# Patient Record
Sex: MALE | Race: WHITE | NOT HISPANIC OR LATINO | Employment: OTHER | URBAN - NONMETROPOLITAN AREA
[De-identification: names, ages, dates, MRNs, and addresses within clinical notes are randomized per-mention and may not be internally consistent; named-entity substitution may affect disease eponyms.]

---

## 2020-10-15 ENCOUNTER — TELEPHONE (OUTPATIENT)
Dept: VASCULAR SURGERY | Facility: CLINIC | Age: 80
End: 2020-10-15

## 2020-10-15 ENCOUNTER — OFFICE VISIT (OUTPATIENT)
Dept: VASCULAR SURGERY | Facility: CLINIC | Age: 80
End: 2020-10-15

## 2020-10-15 VITALS
SYSTOLIC BLOOD PRESSURE: 136 MMHG | HEIGHT: 70 IN | WEIGHT: 183 LBS | DIASTOLIC BLOOD PRESSURE: 86 MMHG | HEART RATE: 61 BPM | OXYGEN SATURATION: 95 % | BODY MASS INDEX: 26.2 KG/M2

## 2020-10-15 DIAGNOSIS — E78.00 HYPERCHOLESTEREMIA: ICD-10-CM

## 2020-10-15 DIAGNOSIS — I65.23 BILATERAL CAROTID ARTERY STENOSIS: Primary | ICD-10-CM

## 2020-10-15 PROCEDURE — 99204 OFFICE O/P NEW MOD 45 MIN: CPT | Performed by: SURGERY

## 2020-10-15 RX ORDER — MULTIPLE VITAMINS W/ MINERALS TAB 9MG-400MCG
1 TAB ORAL DAILY
COMMUNITY

## 2020-10-15 RX ORDER — ROSUVASTATIN CALCIUM 10 MG/1
10 TABLET, COATED ORAL NIGHTLY
COMMUNITY
Start: 2020-09-18

## 2020-10-15 RX ORDER — ASPIRIN 81 MG/1
81 TABLET ORAL DAILY
COMMUNITY

## 2020-10-15 RX ORDER — VITAMIN E 268 MG
1 CAPSULE ORAL DAILY
COMMUNITY

## 2020-10-15 NOTE — PROGRESS NOTES
10/15/2020      Rigo Negrete MD  [address] [place] [address]    Ranjith Rice  1940    Chief Complaint   Patient presents with   • Establish Care     Bilateral carotid stenosis.  Pt had testing on 20 in [place], [place].  Pt had a CT done on 10/12/20 in [place].  Pt denies any stroke like symptoms.  Pt referred by Dr. Rigo Negrete.   • Med Management     Verbally went over the patient's medications with him.  Pt is taking a low dose aspirin and Crestor.       Dear Rigo Negrete MD:      HPI  I had the pleasure of seeing your patient Ranjith Rice in the office today.  Thank you kindly for this consultation.  As you recall, Ranjith Rice is a 80 y.o.  male who you are currently following for routine health maintenance.  He denies strokelike symptoms, but was recently having trouble breathing.  Upon exam, he was found to have a carotid bruit and sent for testing.  He had a carotid duplex and CTA of the neck at an outside facility, showing significant right carotid stenosis of 90%.  He is maintained on aspirin and Crestor.      Past Medical History:   Diagnosis Date   • Hypercholesteremia        Past Surgical History:   Procedure Laterality Date   • KNEE SURGERY Left        History reviewed. No pertinent family history.    Social History     Socioeconomic History   • Marital status: Single     Spouse name: Not on file   • Number of children: Not on file   • Years of education: Not on file   • Highest education level: Not on file   Tobacco Use   • Smoking status: Former Smoker     Types: Cigarettes     Quit date:      Years since quittin.7   • Smokeless tobacco: Never Used   Substance and Sexual Activity   • Alcohol use: Not Currently   • Drug use: Never   • Sexual activity: Defer       No Known Allergies    Current Outpatient Medications   Medication Instructions   • Ascorbic Acid (VITAMIN C ER PO) Oral   • aspirin 81 mg, Oral, Daily   • Flaxseed, Linseed, (FLAX SEED OIL PO) Oral   • Multiple  "Vitamins-Minerals (multivitamin with minerals) tablet tablet 1 tablet, Oral, Daily   • rosuvastatin (CRESTOR) 10 MG tablet No dose, route, or frequency recorded.   • VITAMIN E PO Oral         Review of Systems   Constitutional: Negative.    HENT: Negative.    Eyes: Negative.    Respiratory: Negative.    Cardiovascular: Negative.    Gastrointestinal: Negative.    Endocrine: Negative.    Genitourinary: Negative.    Musculoskeletal: Negative.    Skin: Negative.    Allergic/Immunologic: Negative.    Neurological: Negative.    Hematological: Negative.    Psychiatric/Behavioral: Negative.    All other systems reviewed and are negative.      /86   Pulse 61   Ht 177.8 cm (70\")   Wt 83 kg (183 lb)   SpO2 95%   BMI 26.26 kg/m²   Physical Exam  Vitals signs and nursing note reviewed.   Constitutional:       Appearance: He is well-developed.   HENT:      Head: Normocephalic and atraumatic.   Eyes:      General: No scleral icterus.     Pupils: Pupils are equal, round, and reactive to light.   Neck:      Musculoskeletal: Neck supple.      Thyroid: No thyromegaly.      Vascular: No carotid bruit or JVD.   Cardiovascular:      Rate and Rhythm: Normal rate and regular rhythm.      Pulses:           Carotid pulses are 2+ on the right side and 2+ on the left side.       Femoral pulses are 2+ on the right side and 2+ on the left side.       Popliteal pulses are 2+ on the right side and 2+ on the left side.        Dorsalis pedis pulses are 2+ on the right side and 2+ on the left side.        Posterior tibial pulses are 2+ on the right side and 2+ on the left side.      Heart sounds: Normal heart sounds.   Pulmonary:      Effort: Pulmonary effort is normal.      Breath sounds: Normal breath sounds.   Abdominal:      General: Bowel sounds are normal. There is no distension or abdominal bruit.      Palpations: Abdomen is soft. There is no mass.      Tenderness: There is no abdominal tenderness.   Musculoskeletal: Normal range of " motion.   Lymphadenopathy:      Cervical: No cervical adenopathy.   Skin:     General: Skin is warm and dry.   Neurological:      Mental Status: He is alert and oriented to person, place, and time.      Cranial Nerves: No cranial nerve deficit.      Sensory: No sensory deficit.   Psychiatric:         Mood and Affect: Mood normal.         Behavior: Behavior normal.         Thought Content: Thought content normal.         Judgment: Judgment normal.                 Patient Active Problem List   Diagnosis   • Hypercholesteremia   • Bilateral carotid artery stenosis         ICD-10-CM ICD-9-CM   1. Bilateral carotid artery stenosis  I65.23 433.10     433.30   2. Hypercholesteremia  E78.00 272.0       Plan: After thoroughly evaluating Ranjith Rice, I believe the best course of action is to proceed with a right carotid endarterectomy for stroke risk reduction.  Patient does have significant right-sided carotid occlusive disease confirmed on CTA. Risks of carotid endarterectomy include, but are not limited to, bleeding, infection, vessel damage, nerve damage, MI, stroke, and death.  The patient understands these risks and would like to proceed with the procedure.  He will also need cardiac clearance prior to the procedure.  Once he is cleared, we will call him and schedule appropriately.  In the meantime, I will have the disc sent here so I can personally review it. I did discuss vascular risk factors as they pertain to the progression of vascular disease including controlling hypercholesterolemia.  This risk factors currently controlled and stable.  The patient can continue taking their current medication regimen as previously planned.  This was all discussed in full with complete understanding.    Thank you for allowing me to participate in the care of your patient.  Please do not hesitate with any questions or concerns.  I will keep you aware of any further encounters with Ranjith Rice.        Sincerely yours,         Gabriel  LES Wright DO         Scribed for Dr. Gabriel Wright by Tracey KESSLER

## 2020-10-15 NOTE — TELEPHONE ENCOUNTER
Spoke with Arelis at [facility] in regards to getting Mr Rice's CT Scan on a disc. Arelis advised they could get us the Disc mailed out they just needed us to fax over a request with Name, Date of Birth, and what we are requesting faxed to [phone].

## 2020-10-19 ENCOUNTER — OFFICE VISIT (OUTPATIENT)
Dept: CARDIOLOGY | Facility: CLINIC | Age: 80
End: 2020-10-19

## 2020-10-19 VITALS
DIASTOLIC BLOOD PRESSURE: 86 MMHG | HEART RATE: 75 BPM | SYSTOLIC BLOOD PRESSURE: 150 MMHG | OXYGEN SATURATION: 96 % | HEIGHT: 70 IN | BODY MASS INDEX: 25.77 KG/M2 | WEIGHT: 180 LBS

## 2020-10-19 DIAGNOSIS — Z87.891 EX-SMOKER FOR MORE THAN 1 YEAR: ICD-10-CM

## 2020-10-19 DIAGNOSIS — Z01.810 PREOP CARDIOVASCULAR EXAM: ICD-10-CM

## 2020-10-19 DIAGNOSIS — R06.09 DOE (DYSPNEA ON EXERTION): ICD-10-CM

## 2020-10-19 DIAGNOSIS — I65.23 BILATERAL CAROTID ARTERY STENOSIS: ICD-10-CM

## 2020-10-19 DIAGNOSIS — R94.31 ABNORMAL ECG: ICD-10-CM

## 2020-10-19 DIAGNOSIS — E78.00 HYPERCHOLESTEREMIA: Primary | ICD-10-CM

## 2020-10-19 PROBLEM — I49.1 PREMATURE ATRIAL COMPLEXES: Status: ACTIVE | Noted: 2020-10-19

## 2020-10-19 PROCEDURE — 99204 OFFICE O/P NEW MOD 45 MIN: CPT | Performed by: INTERNAL MEDICINE

## 2020-10-19 PROCEDURE — 93000 ELECTROCARDIOGRAM COMPLETE: CPT | Performed by: INTERNAL MEDICINE

## 2020-10-19 NOTE — PROGRESS NOTES
Ranjith Rice  [id number]  1940  80 y.o.  male    Referring Provider: Rigo Negrete MD    Reason for  Visit:  Initial visit for  preoperative cardiovascular clearance under general anesthesia for   Bilateral carotid artery stenosis right worse than left for right carotid endarterectomy under follow up Dr Wright      Subjective    Mild chronic exertional shortness of breath on exertion relieved with rest  No significant cough or wheezing    No palpitations  No associated chest pain  No significant pedal edema    No fever or chills  No significant expectoration    No hemoptysis  No presyncope or syncope    Tolerating current medications well with no untoward side effects   Compliant with prescribed medication regimen. Tries to adhere to cardiac diet.     Joint pain in small, medium and large joints   Prior left knee replacement   Ex smoker     BP well controlled at home.     Effort tolerance limited more by orthopedic rather than cardiac related issues, therefore difficult to assess functional capacity.       History of present illness:  Ranjith Rice is a 80 y.o. yo male with history of COPD  who presents today for   Chief Complaint   Patient presents with   • SX Clearance     Carotid Artery wtih Dr Wright-  patient has SOB all the time with COPD. patient does not currently monitor her weight or BP at home. Aptient has not been having any symptoms at this time.    .    History  Past Medical History:   Diagnosis Date   • COPD (chronic obstructive pulmonary disease) (CMS/[facility])    • Hypercholesteremia    ,   Past Surgical History:   Procedure Laterality Date   • KNEE SURGERY Left    ,   Family History   Problem Relation Age of Onset   • No Known Problems Mother    • No Known Problems Father    ,   Social History     Tobacco Use   • Smoking status: Former Smoker     Years: 30.00     Types: Cigarettes     Quit date:      Years since quittin.8   • Smokeless tobacco: Never Used   Substance Use Topics   • Alcohol  "use: Not Currently   • Drug use: Never   ,     Medications  Current Outpatient Medications   Medication Sig Dispense Refill   • Ascorbic Acid (VITAMIN C ER PO) Take  by mouth.     • aspirin 81 MG EC tablet Take 81 mg by mouth Daily.     • Flaxseed, Linseed, (FLAX SEED OIL PO) Take  by mouth.     • Garlic 10 MG capsule Take  by mouth.     • Multiple Vitamins-Minerals (multivitamin with minerals) tablet tablet Take 1 tablet by mouth Daily.     • rosuvastatin (CRESTOR) 10 MG tablet      • VITAMIN E PO Take  by mouth.       No current facility-administered medications for this visit.        Allergies:  Patient has no known allergies.    Review of Systems  Review of Systems   Constitution: Negative for malaise/fatigue.   HENT: Negative.    Eyes: Negative.    Cardiovascular: Positive for dyspnea on exertion. Negative for chest pain, claudication, cyanosis, irregular heartbeat, leg swelling, near-syncope, orthopnea, palpitations, paroxysmal nocturnal dyspnea and syncope.   Respiratory: Negative.    Endocrine: Negative.    Hematologic/Lymphatic: Negative.    Skin: Negative.    Musculoskeletal: Positive for arthritis and joint pain.   Gastrointestinal: Negative for anorexia.   Genitourinary: Negative.    Neurological: Negative.    Psychiatric/Behavioral: Negative.        Objective      Vitals:    10/19/20 1309 10/19/20 1334   BP: 180/92 150/86   Pulse: 75    SpO2: 96%    Weight: 81.6 kg (180 lb)    Height: 177.8 cm (70\")       Physical Exam:  /86   Pulse 75   Ht 177.8 cm (70\")   Wt 81.6 kg (180 lb)   SpO2 96%   BMI 25.83 kg/m²     Physical Exam  Constitutional:       Appearance: He is well-developed.   HENT:      Head: Normocephalic.   Neck:      Musculoskeletal: No edema.      Vascular: Normal carotid pulses. Carotid bruit present. No JVD.      Trachea: No tracheal tenderness or tracheal deviation.   Cardiovascular:      Rate and Rhythm: Regular rhythm.      Pulses: Normal pulses.      Heart sounds: Murmur " present. Systolic murmur present with a grade of 2/6.   Pulmonary:      Effort: Pulmonary effort is normal.      Breath sounds: No stridor.   Abdominal:      General: There is no distension.      Palpations: Abdomen is soft.      Tenderness: There is no abdominal tenderness.   Skin:     General: Skin is warm.   Neurological:      Mental Status: He is alert.      Cranial Nerves: No cranial nerve deficit.      Sensory: No sensory deficit.   Psychiatric:         Speech: Speech normal.         Behavior: Behavior normal.         Results Review:      ____________________________________________________________________________________________________________________________________________  Health maintenance and recommendations    Low salt/ HTN/ Heart healthy carbohydrate restricted cardiac diet   The patient is advised to reduce or avoid caffeine or other cardiac stimulants.   Minimize or avoid  NSAID-type medications      Monitor for any signs of bleeding including red or dark stools. Fall precautions.   Advised staying uptodate with immunizations per established standard guidelines.    Offered to give patient  a copy of my notes     Questions were encouraged, asked and answered to the patient's  understanding and satisfaction. Questions if any regarding current medications and side effects, need for refills and importance of compliance to medications stressed.    Reviewed available prior notes, consults, prior visits, laboratory findings, radiology and cardiology relevant reports. Updated chart as applicable. I have reviewed the patient's medical history in detail and updated the computerized patient record as relevant.      Updated patient regarding any new or relevant abnormalities on review of records or any new findings on physical exam. Mentioned to patient about purpose of visit and desirable health short and long term goals and objectives.    Primary to monitor CBC CMP Lipid panel and TSH as  applicable    ___________________________________________________________________________________________________________________________________________         ECG 12 Lead    Date/Time: 10/19/2020 1:27 PM  Performed by: Ricky Mckeon MD  Authorized by: Ricky Mckeon MD   Comparison: not compared with previous ECG   Rhythm: sinus rhythm and sinus arrhythmia  Rate: normal  QRS axis: right  Other findings: non-specific ST-T wave changes    Clinical impression: abnormal EKG            Assessment/Plan   Diagnoses and all orders for this visit:    1. Hypercholesteremia (Primary)    2. Bilateral carotid artery stenosis    3. Preop cardiovascular exam    4. Ex-smoker for more than 1 year    5. MANTILLA (dyspnea on exertion)  -     Adult Stress Echo W/ Cont or Stress Agent if Necessary Per Protocol; Future    6. Abnormal ECG  -     Adult Transthoracic Echo Complete W/ Cont if Necessary Per Protocol; Future    Other orders  -     ECG 12 Lead             Plan    Orders Placed This Encounter   Procedures   • ECG 12 Lead     This order was created via procedure documentation   • Adult Transthoracic Echo Complete W/ Cont if Necessary Per Protocol     Standing Status:   Future     Standing Expiration Date:   10/19/2021     Order Specific Question:   Reason for exam?     Answer:   Dyspnea   • Adult Stress Echo W/ Cont or Stress Agent if Necessary Per Protocol     Myocardial strain to be performed during echocardiogram as long as technically feasible     Standing Status:   Future     Standing Expiration Date:   10/19/2021     Order Specific Question:   What stress agent will be used?     Answer:   Dobutamine     Order Specific Question:   Difficulty walking criteria?     Answer:   Musculoskeletal (hips, knees, feet, back, amputee)     Order Specific Question:   Reason for exam?     Answer:   Dyspnea        Call for results of cardiac tests after done for clearance for surgery      Keep LDL below 70 mg/dl. Monitor liver and renal  functions.   Monitor CBC, CMP, TSH (as indicated) and Lipid Panel by primary         Return in about 6 months (around 4/19/2021).

## 2020-10-27 ENCOUNTER — HOSPITAL ENCOUNTER (OUTPATIENT)
Dept: CARDIOLOGY | Facility: HOSPITAL | Age: 80
Discharge: HOME | End: 2020-10-27

## 2020-10-27 VITALS
BODY MASS INDEX: 25.77 KG/M2 | SYSTOLIC BLOOD PRESSURE: 150 MMHG | DIASTOLIC BLOOD PRESSURE: 86 MMHG | WEIGHT: 180 LBS | HEIGHT: 70 IN

## 2020-10-27 VITALS
WEIGHT: 178.57 LBS | SYSTOLIC BLOOD PRESSURE: 170 MMHG | HEIGHT: 70 IN | BODY MASS INDEX: 25.56 KG/M2 | DIASTOLIC BLOOD PRESSURE: 81 MMHG | HEART RATE: 70 BPM

## 2020-10-27 DIAGNOSIS — R06.09 DOE (DYSPNEA ON EXERTION): ICD-10-CM

## 2020-10-27 DIAGNOSIS — R94.31 ABNORMAL ECG: ICD-10-CM

## 2020-10-27 PROCEDURE — 93017 CV STRESS TEST TRACING ONLY: CPT

## 2020-10-27 PROCEDURE — 25010000003 DOBUTAMINE PER 250 MG: Performed by: INTERNAL MEDICINE

## 2020-10-27 PROCEDURE — 93350 STRESS TTE ONLY: CPT

## 2020-10-27 PROCEDURE — 93352 ADMIN ECG CONTRAST AGENT: CPT | Performed by: INTERNAL MEDICINE

## 2020-10-27 PROCEDURE — 25010000002 PERFLUTREN 6.52 MG/ML SUSPENSION: Performed by: INTERNAL MEDICINE

## 2020-10-27 PROCEDURE — 93350 STRESS TTE ONLY: CPT | Performed by: INTERNAL MEDICINE

## 2020-10-27 PROCEDURE — 93018 CV STRESS TEST I&R ONLY: CPT | Performed by: INTERNAL MEDICINE

## 2020-10-27 PROCEDURE — 93306 TTE W/DOPPLER COMPLETE: CPT | Performed by: INTERNAL MEDICINE

## 2020-10-27 PROCEDURE — 93306 TTE W/DOPPLER COMPLETE: CPT

## 2020-10-27 RX ORDER — DOBUTAMINE HYDROCHLORIDE 100 MG/100ML
10-50 INJECTION INTRAVENOUS CONTINUOUS
Status: DISCONTINUED | OUTPATIENT
Start: 2020-10-27 | End: 2020-10-28 | Stop reason: HOSPADM

## 2020-10-27 RX ADMIN — Medication 10 MCG/KG/MIN: at 10:48

## 2020-10-27 RX ADMIN — PERFLUTREN 8.48 MG: 6.52 INJECTION, SUSPENSION INTRAVENOUS at 10:47

## 2020-10-28 LAB
BH CV ECHO MEAS - AO MAX PG (FULL): 34.4 MMHG
BH CV ECHO MEAS - AO MAX PG: 37.2 MMHG
BH CV ECHO MEAS - AO MEAN PG (FULL): 21 MMHG
BH CV ECHO MEAS - AO MEAN PG: 23 MMHG
BH CV ECHO MEAS - AO ROOT AREA: 9.1 CM^2
BH CV ECHO MEAS - AO ROOT DIAM: 3.4 CM
BH CV ECHO MEAS - AO V2 MAX: 305 CM/SEC
BH CV ECHO MEAS - AO V2 MEAN: 231 CM/SEC
BH CV ECHO MEAS - AO V2 VTI: 82.1 CM
BH CV ECHO MEAS - AVA(I,A): 0.89 CM^2
BH CV ECHO MEAS - AVA(I,D): 0.89 CM^2
BH CV ECHO MEAS - AVA(V,A): 0.87 CM^2
BH CV ECHO MEAS - AVA(V,D): 0.87 CM^2
BH CV ECHO MEAS - EDV(CUBED): 46.7 ML
BH CV ECHO MEAS - EDV(MOD-SP4): 76 ML
BH CV ECHO MEAS - EDV(TEICH): 54.4 ML
BH CV ECHO MEAS - EF(CUBED): 70.4 %
BH CV ECHO MEAS - EF(MOD-SP4): 61.8 %
BH CV ECHO MEAS - EF(TEICH): 63 %
BH CV ECHO MEAS - ESV(CUBED): 13.8 ML
BH CV ECHO MEAS - ESV(MOD-SP4): 29 ML
BH CV ECHO MEAS - ESV(TEICH): 20.2 ML
BH CV ECHO MEAS - FS: 33.3 %
BH CV ECHO MEAS - IVS/LVPW: 1
BH CV ECHO MEAS - IVSD: 0.9 CM
BH CV ECHO MEAS - LA DIMENSION: 3.3 CM
BH CV ECHO MEAS - LA/AO: 0.97
BH CV ECHO MEAS - LAT PEAK E' VEL: 7.7 CM/SEC
BH CV ECHO MEAS - LV MASS(C)D: 92.8 GRAMS
BH CV ECHO MEAS - LV MAX PG: 2.8 MMHG
BH CV ECHO MEAS - LV MEAN PG: 2 MMHG
BH CV ECHO MEAS - LV V1 MAX: 84.2 CM/SEC
BH CV ECHO MEAS - LV V1 MEAN: 55.6 CM/SEC
BH CV ECHO MEAS - LV V1 VTI: 23.3 CM
BH CV ECHO MEAS - LVIDD: 3.6 CM
BH CV ECHO MEAS - LVIDS: 2.4 CM
BH CV ECHO MEAS - LVLD AP4: 7.8 CM
BH CV ECHO MEAS - LVLS AP4: 6.1 CM
BH CV ECHO MEAS - LVOT AREA (M): 3.1 CM^2
BH CV ECHO MEAS - LVOT AREA: 3.1 CM^2
BH CV ECHO MEAS - LVOT DIAM: 2 CM
BH CV ECHO MEAS - LVPWD: 0.9 CM
BH CV ECHO MEAS - MED PEAK E' VEL: 6 CM/SEC
BH CV ECHO MEAS - MV A MAX VEL: 57.3 CM/SEC
BH CV ECHO MEAS - MV DEC SLOPE: 291 CM/SEC^2
BH CV ECHO MEAS - MV DEC TIME: 0.19 SEC
BH CV ECHO MEAS - MV E MAX VEL: 74 CM/SEC
BH CV ECHO MEAS - MV E/A: 1.3
BH CV ECHO MEAS - MV P1/2T MAX VEL: 82.8 CM/SEC
BH CV ECHO MEAS - MV P1/2T: 83.3 MSEC
BH CV ECHO MEAS - MVA P1/2T LCG: 2.7 CM^2
BH CV ECHO MEAS - MVA(P1/2T): 2.6 CM^2
BH CV ECHO MEAS - PA MAX PG: 2.8 MMHG
BH CV ECHO MEAS - PA V2 MAX: 84.2 CM/SEC
BH CV ECHO MEAS - SV(AO): 745.4 ML
BH CV ECHO MEAS - SV(CUBED): 32.8 ML
BH CV ECHO MEAS - SV(LVOT): 73.2 ML
BH CV ECHO MEAS - SV(MOD-SP4): 47 ML
BH CV ECHO MEAS - SV(TEICH): 34.3 ML
BH CV ECHO MEAS - TR MAX VEL: 100 CM/SEC
BH CV ECHO MEASUREMENTS AVERAGE E/E' RATIO: 10.8
BH CV STRESS BP STAGE 1: NORMAL
BH CV STRESS BP STAGE 2: NORMAL
BH CV STRESS DOSE DOBUTAMINE STAGE 1: 10
BH CV STRESS DOSE DOBUTAMINE STAGE 2: 20
BH CV STRESS DURATION MIN STAGE 1: 3
BH CV STRESS DURATION MIN STAGE 2: 3
BH CV STRESS DURATION SEC STAGE 1: 0
BH CV STRESS DURATION SEC STAGE 2: 55
BH CV STRESS ECHO POST STRESS EJECTION FRACTION EF: 65 %
BH CV STRESS HR STAGE 1: 89
BH CV STRESS HR STAGE 2: 125
BH CV STRESS PROTOCOL 1: NORMAL
BH CV STRESS RECOVERY BP: NORMAL MMHG
BH CV STRESS RECOVERY HR: 89 BPM
BH CV STRESS STAGE 1: 1
BH CV STRESS STAGE 2: 2
LV EF 2D ECHO EST: 55 %
MAXIMAL PREDICTED HEART RATE: 142 BPM
MAXIMAL PREDICTED HEART RATE: 142 BPM
PERCENT MAX PREDICTED HR: 88.03 %
STRESS BASELINE BP: NORMAL MMHG
STRESS BASELINE HR: 65 BPM
STRESS PERCENT HR: 104 %
STRESS POST EXERCISE DUR MIN: 6 MIN
STRESS POST EXERCISE DUR SEC: 55 SEC
STRESS POST PEAK BP: NORMAL MMHG
STRESS POST PEAK HR: 125 BPM
STRESS TARGET HR: 121 BPM
STRESS TARGET HR: 121 BPM

## 2020-10-29 ENCOUNTER — TELEPHONE (OUTPATIENT)
Dept: CARDIOLOGY | Facility: CLINIC | Age: 80
End: 2020-10-29

## 2020-11-03 ENCOUNTER — TELEPHONE (OUTPATIENT)
Dept: VASCULAR SURGERY | Facility: CLINIC | Age: 80
End: 2020-11-03

## 2020-11-03 ENCOUNTER — PREP FOR SURGERY (OUTPATIENT)
Dept: OTHER | Facility: HOSPITAL | Age: 80
End: 2020-11-03

## 2020-11-03 DIAGNOSIS — Z79.02 ENCOUNTER FOR MONITORING ANTIPLATELET THERAPY: ICD-10-CM

## 2020-11-03 DIAGNOSIS — Z01.818 PREOP TESTING: ICD-10-CM

## 2020-11-03 DIAGNOSIS — I65.23 BILATERAL CAROTID ARTERY STENOSIS: Primary | ICD-10-CM

## 2020-11-03 DIAGNOSIS — Z51.81 ENCOUNTER FOR MONITORING ANTIPLATELET THERAPY: ICD-10-CM

## 2020-11-03 RX ORDER — BUPIVACAINE HCL/0.9 % NACL/PF 0.1 %
2 PLASTIC BAG, INJECTION (ML) EPIDURAL ONCE
Status: CANCELLED | OUTPATIENT
Start: 2020-11-03 | End: 2020-11-03

## 2020-11-03 NOTE — TELEPHONE ENCOUNTER
----- Message from CHECO Kay sent at 11/3/2020 10:02 AM CST -----  Regarding: FW: Cardiac Risk Assessment    ----- Message -----  From: Ricky Mckeon MD  Sent: 11/1/2020   5:17 AM CST  To: CHECO Kay  Subject: RE: Cardiac Risk Assessment                      Acceptable cardiovascular risk of planned procedure.  Can proceed with surgery with usual caution and perioperative hemodynamic and cardiac rhythm monitoring.     ----- Message -----  From: Wanda Wall APRN  Sent: 10/29/2020   1:11 PM CST  To: Ricky Mckeon MD  Subject: Cardiac Risk Assessment                            ----- Message -----  From: Sherly Lee  Sent: 10/29/2020   1:07 PM CDT  To: CHECO Kay    Can you please advise of heart clearance?  Testing has been completed.  Thank you!

## 2020-11-11 ENCOUNTER — TELEPHONE (OUTPATIENT)
Dept: VASCULAR SURGERY | Facility: CLINIC | Age: 80
End: 2020-11-11

## 2020-11-11 ENCOUNTER — TRANSCRIBE ORDERS (OUTPATIENT)
Dept: ADMINISTRATIVE | Facility: HOSPITAL | Age: 80
End: 2020-11-11

## 2020-11-11 DIAGNOSIS — Z01.818 PREOP TESTING: Primary | ICD-10-CM

## 2020-11-11 NOTE — TELEPHONE ENCOUNTER
Spoke with patient and advised of upcoming procedure.  Patient pre work is scheduled for 11/13/2020 at 1015 am.  Patient procedure is scheduled for 11/16/2020 at 800 am.  Patient advised of covid testing, masking, and visitation, including that there are no overnight guests. Patient advised of location time and prep.  Patient expressed understanding for all that was discussed.

## 2020-11-11 NOTE — TELEPHONE ENCOUNTER
Left message requesting patient return my call in reference to upcoming procedure with Dr. Wright.  Patient pre work is scheduled for 11/13/2020 at 1015 am.  Patient procedure is scheduled for 11/16/2020 at 800 am.  Advised of phone number for patient return my call.

## 2020-11-13 ENCOUNTER — LAB (OUTPATIENT)
Dept: LAB | Facility: HOSPITAL | Age: 80
End: 2020-11-13

## 2020-11-13 ENCOUNTER — TELEPHONE (OUTPATIENT)
Dept: VASCULAR SURGERY | Facility: CLINIC | Age: 80
End: 2020-11-13

## 2020-11-13 ENCOUNTER — HOSPITAL ENCOUNTER (OUTPATIENT)
Dept: GENERAL RADIOLOGY | Facility: HOSPITAL | Age: 80
Discharge: HOME | End: 2020-11-13

## 2020-11-13 ENCOUNTER — APPOINTMENT (OUTPATIENT)
Dept: PREADMISSION TESTING | Facility: HOSPITAL | Age: 80
End: 2020-11-13

## 2020-11-13 VITALS
WEIGHT: 177.69 LBS | HEART RATE: 78 BPM | HEIGHT: 68 IN | OXYGEN SATURATION: 95 % | RESPIRATION RATE: 16 BRPM | SYSTOLIC BLOOD PRESSURE: 144 MMHG | DIASTOLIC BLOOD PRESSURE: 87 MMHG | BODY MASS INDEX: 26.93 KG/M2

## 2020-11-13 DIAGNOSIS — I65.23 BILATERAL CAROTID ARTERY STENOSIS: ICD-10-CM

## 2020-11-13 DIAGNOSIS — Z79.02 ENCOUNTER FOR MONITORING ANTIPLATELET THERAPY: ICD-10-CM

## 2020-11-13 DIAGNOSIS — Z51.81 ENCOUNTER FOR MONITORING ANTIPLATELET THERAPY: ICD-10-CM

## 2020-11-13 DIAGNOSIS — Z01.818 PREOP TESTING: ICD-10-CM

## 2020-11-13 LAB
ANION GAP SERPL CALCULATED.3IONS-SCNC: 7 MMOL/L (ref 5–15)
APTT PPP: 34.3 SECONDS (ref 24.1–35)
BASOPHILS # BLD AUTO: 0.06 10*3/MM3 (ref 0–0.2)
BASOPHILS NFR BLD AUTO: 0.9 % (ref 0–1.5)
BUN SERPL-MCNC: 9 MG/DL (ref 8–23)
BUN/CREAT SERPL: 9 (ref 7–25)
CALCIUM SPEC-SCNC: 9.4 MG/DL (ref 8.6–10.5)
CHLORIDE SERPL-SCNC: 108 MMOL/L (ref 98–107)
CO2 SERPL-SCNC: 25 MMOL/L (ref 22–29)
CREAT SERPL-MCNC: 1 MG/DL (ref 0.76–1.27)
DEPRECATED RDW RBC AUTO: 44.4 FL (ref 37–54)
EOSINOPHIL # BLD AUTO: 0.23 10*3/MM3 (ref 0–0.4)
EOSINOPHIL NFR BLD AUTO: 3.3 % (ref 0.3–6.2)
ERYTHROCYTE [DISTWIDTH] IN BLOOD BY AUTOMATED COUNT: 12.5 % (ref 12.3–15.4)
GFR SERPL CREATININE-BSD FRML MDRD: 72 ML/MIN/1.73
GLUCOSE SERPL-MCNC: 133 MG/DL (ref 65–99)
HCT VFR BLD AUTO: 44.5 % (ref 37.5–51)
HGB BLD-MCNC: 15.5 G/DL (ref 13–17.7)
IMM GRANULOCYTES # BLD AUTO: 0.02 10*3/MM3 (ref 0–0.05)
IMM GRANULOCYTES NFR BLD AUTO: 0.3 % (ref 0–0.5)
INR PPP: 0.96 (ref 0.91–1.09)
LYMPHOCYTES # BLD AUTO: 1.31 10*3/MM3 (ref 0.7–3.1)
LYMPHOCYTES NFR BLD AUTO: 18.8 % (ref 19.6–45.3)
MCH RBC QN AUTO: 33.9 PG (ref 26.6–33)
MCHC RBC AUTO-ENTMCNC: 34.8 G/DL (ref 31.5–35.7)
MCV RBC AUTO: 97.4 FL (ref 79–97)
MONOCYTES # BLD AUTO: 0.69 10*3/MM3 (ref 0.1–0.9)
MONOCYTES NFR BLD AUTO: 9.9 % (ref 5–12)
NEUTROPHILS NFR BLD AUTO: 4.66 10*3/MM3 (ref 1.7–7)
NEUTROPHILS NFR BLD AUTO: 66.8 % (ref 42.7–76)
NRBC BLD AUTO-RTO: 0 /100 WBC (ref 0–0.2)
PLATELET # BLD AUTO: 248 10*3/MM3 (ref 140–450)
PMV BLD AUTO: 10.1 FL (ref 6–12)
POTASSIUM SERPL-SCNC: 5.5 MMOL/L (ref 3.5–5.2)
PROTHROMBIN TIME: 12.4 SECONDS (ref 11.9–14.6)
RBC # BLD AUTO: 4.57 10*6/MM3 (ref 4.14–5.8)
SODIUM SERPL-SCNC: 140 MMOL/L (ref 136–145)
WBC # BLD AUTO: 6.97 10*3/MM3 (ref 3.4–10.8)

## 2020-11-13 PROCEDURE — 36415 COLL VENOUS BLD VENIPUNCTURE: CPT

## 2020-11-13 PROCEDURE — 85730 THROMBOPLASTIN TIME PARTIAL: CPT

## 2020-11-13 PROCEDURE — 71046 X-RAY EXAM CHEST 2 VIEWS: CPT

## 2020-11-13 PROCEDURE — 85025 COMPLETE CBC W/AUTO DIFF WBC: CPT

## 2020-11-13 PROCEDURE — U0003 INFECTIOUS AGENT DETECTION BY NUCLEIC ACID (DNA OR RNA); SEVERE ACUTE RESPIRATORY SYNDROME CORONAVIRUS 2 (SARS-COV-2) (CORONAVIRUS DISEASE [COVID-19]), AMPLIFIED PROBE TECHNIQUE, MAKING USE OF HIGH THROUGHPUT TECHNOLOGIES AS DESCRIBED BY CMS-2020-01-R: HCPCS | Performed by: SURGERY

## 2020-11-13 PROCEDURE — C9803 HOPD COVID-19 SPEC COLLECT: HCPCS | Performed by: SURGERY

## 2020-11-13 PROCEDURE — 80048 BASIC METABOLIC PNL TOTAL CA: CPT

## 2020-11-13 PROCEDURE — 85610 PROTHROMBIN TIME: CPT

## 2020-11-13 RX ORDER — ALBUTEROL SULFATE 90 UG/1
2 AEROSOL, METERED RESPIRATORY (INHALATION) DAILY
COMMUNITY

## 2020-11-13 NOTE — DISCHARGE INSTRUCTIONS
DAY OF SURGERY INSTRUCTIONS        YOUR SURGEON: Dr. Wright    PROCEDURE: Carotid endarterectomy    DATE OF SURGERY: 11/16/2020    ARRIVAL TIME: AS DIRECTED BY OFFICE    YOU MAY TAKE THE FOLLOWING MEDICATION(S) THE MORNING OF SURGERY WITH A SIP OF WATER: albuterol sulfate  (90 Base) MCG/ACT inhaler,         ALL OTHER HOME MEDICATION CHECK WITH YOUR PHYSICIAN      DO NOT TAKE ANY ERECTILE DYSFUNCTION MEDICATIONS (EX: CIALIS, VIAGRA) 24 HOURS PRIOR TO SURGERY                      MANAGING PAIN AFTER SURGERY    We know you are probably wondering what your pain will be like after surgery.  Following surgery it is unrealistic to expect you will not have pain.   Pain is how our bodies let us know that something is wrong or cautions us to be careful.  That said, our goal is to make your pain tolerable.    Methods we may use to treat your pain include (oral or IV medications, PCAs, epidurals, nerve blocks, etc.)   While some procedures require IV pain medications for a short time after surgery, transitioning to pain medications by mouth allows for better management of pain.   Your nurse will encourage you to take oral pain medications whenever possible.  IV medications work almost immediately, but only last a short while.  Taking medications by mouth allows for a more constant level of medication in your blood stream for a longer period of time.      Once your pain is out of control it is harder to get back under control.  It is important you are aware when your next dose of pain medication is due.  If you are admitted, your nurse may write the time of your next dose on the white board in your room to help you remember.      We are interested in your pain and encourage you to inform us about aggravating factors during your visit.   Many times a simple repositioning every few hours can make a big difference.    If your physician says it is okay, do not let your pain prevent you from getting out of bed. Be sure to  call your nurse for assistance prior to getting up so you do not fall.      Before surgery, please decide your tolerable pain goal.  These faces help describe the pain ratings we use on a 0-10 scale.   Be prepared to tell us your goal and whether or not you take pain or anxiety medications at home.          BEFORE YOU COME TO THE HOSPITAL  (Pre-op instructions)  • Do not eat, drink, smoke or chew gum after midnight the night before surgery.  This also includes no mints.  • Morning of surgery take only the medicines you have been instructed with a sip of water unless otherwise instructed  by your physician.  • Do not shave, wear makeup or dark nail polish.  • Remove all jewelry including rings.  • Leave anything you consider valuable at home.  • Leave your suitcase in the car until after your surgery.  • Bring the following with you if applicable:  o Picture ID and insurance, Medicare or Medicaid cards  o Co-pay/deductible required by insurance (cash, check, credit card)  o Copy of advance directive, living will or power-of- documents if not brought to PAT  o CPAP or BIPAP mask and tubing  o Relaxation aids ( book, magazine), etc.  o Hearing aids                        ON THE DAY OF SURGERY  · On the day of surgery check in at registration located at the main entrance of the hospital.   ? You will be registered and given a beeper with instructions where to wait in the main lobby.  ? When your beeper lights up and vibrates a member of the Outpatient Surgery staff will meet you at the double doors under the stair steps and escort you to your preoperative room.   · You may have cloth compression devices placed on your legs. These help to prevent blood clots and reduce swelling in your legs.  · An IV may be inserted into one of your veins.  · In the operating room, you may be given one or more of the following:  ? A medicine to help you relax (sedative).  ? A medicine to numb the area (local anesthetic).  ? A  "medicine to make you fall asleep (general anesthetic).  ? A medicine that is injected into an area of your body to numb everything below the injection site (regional anesthetic).  · Your surgical site will be marked or identified.  · You may be given an antibiotic through your IV to help prevent infection.  Contact a health care provider if you:  · Develop a fever of more than 100.4°F (38°C) or other feelings of illness during the 48 hours before your surgery.  · Have symptoms that get worse.  Have questions or concerns about your surgery    General Anesthesia/Surgery, Adult  General anesthesia is the use of medicines to make a person \"go to sleep\" (unconscious) for a medical procedure. General anesthesia must be used for certain procedures, and is often recommended for procedures that:  · Last a long time.  · Require you to be still or in an unusual position.  · Are major and can cause blood loss.  The medicines used for general anesthesia are called general anesthetics. As well as making you unconscious for a certain amount of time, these medicines:  · Prevent pain.  · Control your blood pressure.  · Relax your muscles.  Tell a health care provider about:  · Any allergies you have.  · All medicines you are taking, including vitamins, herbs, eye drops, creams, and over-the-counter medicines.  · Any problems you or family members have had with anesthetic medicines.  · Types of anesthetics you have had in the past.  · Any blood disorders you have.  · Any surgeries you have had.  · Any medical conditions you have.  · Any recent upper respiratory, chest, or ear infections.  · Any history of:  ? Heart or lung conditions, such as heart failure, sleep apnea, asthma, or chronic obstructive pulmonary disease (COPD).  ?  service.  ? Depression or anxiety.  · Any tobacco or drug use, including marijuana or alcohol use.  · Whether you are pregnant or may be pregnant.  What are the risks?  Generally, this is a safe " procedure. However, problems may occur, including:  · Allergic reaction.  · Lung and heart problems.  · Inhaling food or liquid from the stomach into the lungs (aspiration).  · Nerve injury.  · Air in the bloodstream, which can lead to stroke.  · Extreme agitation or confusion (delirium) when you wake up from the anesthetic.  · Waking up during your procedure and being unable to move. This is rare.  These problems are more likely to develop if you are having a major surgery or if you have an advanced or serious medical condition. You can prevent some of these complications by answering all of your health care provider's questions thoroughly and by following all instructions before your procedure.  General anesthesia can cause side effects, including:  · Nausea or vomiting.  · A sore throat from the breathing tube.  · Hoarseness.  · Wheezing or coughing.  · Shaking chills.  · Tiredness.  · Body aches.  · Anxiety.  · Sleepiness or drowsiness.  · Confusion or agitation.  RISKS AND COMPLICATIONS OF SURGERY  Your health care provider will discuss possible risks and complications with you before surgery. Common risks and complications include:    · Problems due to the use of anesthetics.  · Blood loss and replacement (does not apply to minor surgical procedures).  · Temporary increase in pain due to surgery.  · Uncorrected pain or problems that the surgery was meant to correct.  · Infection.  · New damage.    What happens before the procedure?    Medicines  Ask your health care provider about:  · Changing or stopping your regular medicines. This is especially important if you are taking diabetes medicines or blood thinners.  · Taking medicines such as aspirin and ibuprofen. These medicines can thin your blood. Do not take these medicines unless your health care provider tells you to take them.  · Taking over-the-counter medicines, vitamins, herbs, and supplements. Do not take these during the week before your procedure  unless your health care provider approves them.  General instructions  · Starting 3-6 weeks before the procedure, do not use any products that contain nicotine or tobacco, such as cigarettes and e-cigarettes. If you need help quitting, ask your health care provider.  · If you brush your teeth on the morning of the procedure, make sure to spit out all of the toothpaste.  · Tell your health care provider if you become ill or develop a cold, cough, or fever.  · If instructed by your health care provider, bring your sleep apnea device with you on the day of your surgery (if applicable).  · Ask your health care provider if you will be going home the same day, the following day, or after a longer hospital stay.  ? Plan to have someone take you home from the hospital or clinic.  ? Plan to have a responsible adult care for you for at least 24 hours after you leave the hospital or clinic. This is important.  What happens during the procedure?  · You will be given anesthetics through both of the following:  ? A mask placed over your nose and mouth.  ? An IV in one of your veins.  · You may receive a medicine to help you relax (sedative).  · After you are unconscious, a breathing tube may be inserted down your throat to help you breathe. This will be removed before you wake up.  · An anesthesia specialist will stay with you throughout your procedure. He or she will:  ? Keep you comfortable and safe by continuing to give you medicines and adjusting the amount of medicine that you get.  ? Monitor your blood pressure, pulse, and oxygen levels to make sure that the anesthetics do not cause any problems.  The procedure may vary among health care providers and hospitals.  What happens after the procedure?  · Your blood pressure, temperature, heart rate, breathing rate, and blood oxygen level will be monitored until the medicines you were given have worn off.  · You will wake up in a recovery area. You may wake up slowly.  · If you  feel anxious or agitated, you may be given medicine to help you calm down.  · If you will be going home the same day, your health care provider may check to make sure you can walk, drink, and urinate.  · Your health care provider will treat any pain or side effects you have before you go home.  · Do not drive for 24 hours if you were given a sedative.  Summary  · General anesthesia is used to keep you still and prevent pain during a procedure.  · It is important to tell your healthcare provider about your medical history and any surgeries you have had, and previous experience with anesthesia.  · Follow your healthcare provider’s instructions about when to stop eating, drinking, or taking certain medicines before your procedure.  · Plan to have someone take you home from the hospital or clinic.  This information is not intended to replace advice given to you by your health care provider. Make sure you discuss any questions you have with your health care provider.  Document Released: 03/26/2009 Document Revised: 08/03/2018 Document Reviewed: 08/03/2018  [employer] Interactive Patient Education © 2019 [employer]       Fall Prevention in Hospitals, Adult  As a hospital patient, your condition and the treatments you receive can increase your risk for falls. Some additional risk factors for falls in a hospital include:  · Being in an unfamiliar environment.  · Being on bed rest.  · Your surgery.  · Taking certain medicines.  · Your tubing requirements, such as intravenous (IV) therapy or catheters.  It is important that you learn how to decrease fall risks while at the hospital. Below are important tips that can help prevent falls.  SAFETY TIPS FOR PREVENTING FALLS  Talk about your risk of falling.  · Ask your health care provider why you are at risk for falling. Is it your medicine, illness, tubing placement, or something else?  · Make a plan with your health care provider to keep you safe from falls.  · Ask your health  care provider or pharmacist about side effects of your medicines. Some medicines can make you dizzy or affect your coordination.  Ask for help.  · Ask for help before getting out of bed. You may need to press your call button.  · Ask for assistance in getting safely to the toilet.  · Ask for a walker or cane to be put at your bedside. Ask that most of the side rails on your bed be placed up before your health care provider leaves the room.  · Ask family or friends to sit with you.  · Ask for things that are out of your reach, such as your glasses, hearing aids, telephone, bedside table, or call button.  Follow these tips to avoid falling:  · Stay lying or seated, rather than standing, while waiting for help.  · Wear rubber-soled slippers or shoes whenever you walk in the hospital.  · Avoid quick, sudden movements.  ¨ Change positions slowly.  ¨ Sit on the side of your bed before standing.  ¨ Stand up slowly and wait before you start to walk.  · Let your health care provider know if there is a spill on the floor.  · Pay careful attention to the medical equipment, electrical cords, and tubes around you.  · When you need help, use your call button by your bed or in the bathroom. Wait for one of your health care providers to help you.  · If you feel dizzy or unsure of your footing, return to bed and wait for assistance.  · Avoid being distracted by the TV, telephone, or another person in your room.  · Do not lean or support yourself on rolling objects, such as IV poles or bedside tables.     This information is not intended to replace advice given to you by your health care provider. Make sure you discuss any questions you have with your health care provider.     Document Released: 12/15/2001 Document Revised: 01/08/2016 Document Reviewed: 08/25/2013  [employer] Interactive Patient Education ©2016 [employer]       [facility] 4% Patient Instruction Sheet    Chlorhexidine Before Surgery  Chlorhexidine  gluconate (CHG) is a germ-killing (antiseptic) solution that is used to clean the skin. It gets rid of the bacteria that normally live on the skin. Cleaning your skin with CHG before surgery helps lower the risk for infection after surgery.    How to use CHG solution  · You will take 2 showers, one shower the night before surgery, the second shower the morning of surgery before coming to the hospital.  · Use CHG only as told by your health care provider, and follow the instructions on the label.  · Use CHG solution while taking a shower. Follow these steps when using CHG solution (unless your health care provider gives you different instructions):  1. Start the shower.  2. Use your normal soap and shampoo to wash your face and hair.  3. Turn off the shower or move out of the shower stream.  4. Pour the CHG onto a clean washcloth. Do not use any type of brush or rough-edged sponge.  5. Starting at your neck, lather your body down to your toes. Make sure you:  6. Pay special attention to the part of your body where you will be having surgery. Scrub this area for at least 1 minute.  7. Use the full amount of CHG as directed. Usually, this is one half bottle for each shower.  8. Do not use CHG on your head or face. If the solution gets into your ears or eyes, rinse them well with water.  9. Avoid your genital area.  10. Avoid any areas of skin that have broken skin, cuts, or scrapes.  11. Scrub your back and under your arms. Make sure to wash skin folds.  12. Let the lather sit on your skin for 1-2 minutes or as long as told by your health care  provider.  13. Thoroughly rinse your entire body in the shower. Make sure that all body creases and crevices are rinsed well.  14. Dry off with a clean towel. Do not put any substances on your body afterward, such as powder, lotion, or perfume.  15. Put on clean clothes or pajamas.  16. If it is the night before your surgery, sleep in clean sheets.    What are the risks?  Risks  of using CHG include:  · A skin reaction.  · Hearing loss, if CHG gets in your ears.  · Eye injury, if CHG gets in your eyes and is not rinsed out.  · The CHG product catching fire.  Make sure that you avoid smoking and flames after applying CHG to your skin.  Do not use CHG:  · If you have a chlorhexidine allergy or have previously reacted to chlorhexidine.  · On babies younger than 2 months of age.      On the day of surgery, when you are taken to your room in Outpatient Surgery you will be given a CHG prepackaged cloth to wipe the site for your surgery.  How to use CHG prepackaged cloths  · Follow the instructions on the label.  · Use the CHG cloth on clean, dry skin. Follow these steps when using a CHG cloth (unless your health care provider gives you different instructions):  1. Using the CHG cloth, vigorously scrub the part of your body where you will be having surgery. Scrub using a back-and-forth motion for 3 minutes. The area on your body should be completely wet with CHG when you are finished scrubbing.  2. Do not rinse. Discard the cloth and let the area air-dry for 1 minute. Do not put any substances on your body afterward, such as powder, lotion, or perfume.  Contact a health care provider if:  · Your skin gets irritated after scrubbing.  · You have questions about using your solution or cloth.  Get help right away if:  · Your eyes become very red or swollen.  · Your eyes itch badly.  · Your skin itches badly and is red or swollen.  · Your hearing changes.  · You have trouble seeing.  · You have swelling or tingling in your mouth or throat.  · You have trouble breathing.  · You swallow any chlorhexidine.  Summary  · Chlorhexidine gluconate (CHG) is a germ-killing (antiseptic) solution that is used to clean the skin. Cleaning your skin with CHG before surgery helps lower the risk for infection after surgery.  · You may be given CHG to use at home. It may be in a bottle or in a prepackaged cloth to use on  your skin. Carefully follow your health care provider's instructions and the instructions on the product label.  · Do not use CHG if you have a chlorhexidine allergy.  · Contact your health care provider if your skin gets irritated after scrubbing.  This information is not intended to replace advice given to you by your health care provider. Make sure you discuss any questions you have with your health care provider.  Document Released: 09/11/2013 Document Revised: 11/15/2018 Document Reviewed: 11/15/2018  [employer] Interactive Patient Education © 2019 [employer]          PATIENT/FAMILY/RESPONSIBLE PARTY VERBALIZES UNDERSTANDING OF ABOVE EDUCATION.  COPY OF PAIN SCALE GIVEN AND REVIEWED WITH VERBALIZED UNDERSTANDING.

## 2020-11-13 NOTE — TELEPHONE ENCOUNTER
Spoke with patient and advised that his arrival time had been moved to 800 am for his procedure with Dr. Wright on Monday.  Patient expressed understanding for all that was discussed.

## 2020-11-15 LAB
COVID LABCORP PRIORITY: NORMAL
SARS-COV-2 RNA RESP QL NAA+PROBE: NOT DETECTED

## 2020-11-16 ENCOUNTER — ANESTHESIA (OUTPATIENT)
Dept: PERIOP | Facility: HOSPITAL | Age: 80
End: 2020-11-16

## 2020-11-16 ENCOUNTER — HOSPITAL ENCOUNTER (INPATIENT)
Facility: HOSPITAL | Age: 80
LOS: 1 days | Discharge: HOME | End: 2020-11-17
Attending: SURGERY | Admitting: SURGERY

## 2020-11-16 ENCOUNTER — ANESTHESIA EVENT (OUTPATIENT)
Dept: PERIOP | Facility: HOSPITAL | Age: 80
End: 2020-11-16

## 2020-11-16 ENCOUNTER — APPOINTMENT (OUTPATIENT)
Dept: ULTRASOUND IMAGING | Facility: HOSPITAL | Age: 80
End: 2020-11-16

## 2020-11-16 DIAGNOSIS — Z01.818 PREOP TESTING: ICD-10-CM

## 2020-11-16 DIAGNOSIS — I65.23 BILATERAL CAROTID ARTERY STENOSIS: ICD-10-CM

## 2020-11-16 LAB
ABO GROUP BLD: NORMAL
BLD GP AB SCN SERPL QL: NEGATIVE
POTASSIUM SERPL-SCNC: 3.9 MMOL/L (ref 3.5–5.2)
RH BLD: POSITIVE
T&S EXPIRATION DATE: NORMAL

## 2020-11-16 PROCEDURE — 25010000002 PHENYLEPHRINE 10 MG/ML SOLUTION 1 ML VIAL: Performed by: NURSE ANESTHETIST, CERTIFIED REGISTERED

## 2020-11-16 PROCEDURE — 86850 RBC ANTIBODY SCREEN: CPT | Performed by: NURSE PRACTITIONER

## 2020-11-16 PROCEDURE — 25010000002 FENTANYL CITRATE (PF) 100 MCG/2ML SOLUTION: Performed by: NURSE ANESTHETIST, CERTIFIED REGISTERED

## 2020-11-16 PROCEDURE — C1768 GRAFT, VASCULAR: HCPCS | Performed by: SURGERY

## 2020-11-16 PROCEDURE — 25010000002 HEPARIN (PORCINE) PER 1000 UNITS: Performed by: SURGERY

## 2020-11-16 PROCEDURE — 35301 RECHANNELING OF ARTERY: CPT | Performed by: SURGERY

## 2020-11-16 PROCEDURE — 25010000003 CEFAZOLIN PER 500 MG: Performed by: SURGERY

## 2020-11-16 PROCEDURE — 03UK0KZ SUPPLEMENT RIGHT INTERNAL CAROTID ARTERY WITH NONAUTOLOGOUS TISSUE SUBSTITUTE, OPEN APPROACH: ICD-10-PCS | Performed by: SURGERY

## 2020-11-16 PROCEDURE — 88311 DECALCIFY TISSUE: CPT | Performed by: SURGERY

## 2020-11-16 PROCEDURE — 03CK0ZZ EXTIRPATION OF MATTER FROM RIGHT INTERNAL CAROTID ARTERY, OPEN APPROACH: ICD-10-PCS | Performed by: SURGERY

## 2020-11-16 PROCEDURE — 86901 BLOOD TYPING SEROLOGIC RH(D): CPT | Performed by: NURSE PRACTITIONER

## 2020-11-16 PROCEDURE — 4A10X4Z MONITORING OF CENTRAL NERVOUS ELECTRICAL ACTIVITY, EXTERNAL APPROACH: ICD-10-PCS | Performed by: SURGERY

## 2020-11-16 PROCEDURE — 86900 BLOOD TYPING SEROLOGIC ABO: CPT | Performed by: NURSE PRACTITIONER

## 2020-11-16 PROCEDURE — 94799 UNLISTED PULMONARY SVC/PX: CPT

## 2020-11-16 PROCEDURE — 88304 TISSUE EXAM BY PATHOLOGIST: CPT | Performed by: SURGERY

## 2020-11-16 PROCEDURE — 25010000002 PROPOFOL 10 MG/ML EMULSION: Performed by: NURSE ANESTHETIST, CERTIFIED REGISTERED

## 2020-11-16 PROCEDURE — 25010000003 LIDOCAINE 1 % SOLUTION: Performed by: SURGERY

## 2020-11-16 PROCEDURE — 25010000002 HEPARIN (PORCINE) PER 1000 UNITS: Performed by: NURSE ANESTHETIST, CERTIFIED REGISTERED

## 2020-11-16 PROCEDURE — 93882 EXTRACRANIAL UNI/LTD STUDY: CPT

## 2020-11-16 PROCEDURE — 25010000002 HYDROMORPHONE PER 4 MG: Performed by: ANESTHESIOLOGY

## 2020-11-16 PROCEDURE — 25010000002 ONDANSETRON PER 1 MG: Performed by: ANESTHESIOLOGY

## 2020-11-16 PROCEDURE — 84132 ASSAY OF SERUM POTASSIUM: CPT | Performed by: ANESTHESIOLOGY

## 2020-11-16 DEVICE — PTCH VASC XENOSURE BIO 0.8X8CM: Type: IMPLANTABLE DEVICE | Site: CAROTID | Status: FUNCTIONAL

## 2020-11-16 DEVICE — LIGACLIP MCA MULTIPLE CLIP APPLIERS, 20 SMALL CLIPS
Type: IMPLANTABLE DEVICE | Site: NECK | Status: FUNCTIONAL
Brand: LIGACLIP

## 2020-11-16 DEVICE — LIGACLIP MCA MULTIPLE CLIP APPLIERS, 20 MEDIUM CLIPS
Type: IMPLANTABLE DEVICE | Site: NECK | Status: FUNCTIONAL
Brand: LIGACLIP

## 2020-11-16 RX ORDER — ACETAMINOPHEN 325 MG/1
650 TABLET ORAL EVERY 4 HOURS PRN
Status: DISCONTINUED | OUTPATIENT
Start: 2020-11-16 | End: 2020-11-17 | Stop reason: HOSPADM

## 2020-11-16 RX ORDER — SODIUM CHLORIDE 0.9 % (FLUSH) 0.9 %
3-10 SYRINGE (ML) INJECTION AS NEEDED
Status: DISCONTINUED | OUTPATIENT
Start: 2020-11-16 | End: 2020-11-16 | Stop reason: HOSPADM

## 2020-11-16 RX ORDER — MULTIPLE VITAMINS W/ MINERALS TAB 9MG-400MCG
1 TAB ORAL DAILY
Status: DISCONTINUED | OUTPATIENT
Start: 2020-11-17 | End: 2020-11-17 | Stop reason: HOSPADM

## 2020-11-16 RX ORDER — FLUMAZENIL 0.1 MG/ML
0.2 INJECTION INTRAVENOUS AS NEEDED
Status: DISCONTINUED | OUTPATIENT
Start: 2020-11-16 | End: 2020-11-16 | Stop reason: HOSPADM

## 2020-11-16 RX ORDER — ONDANSETRON 4 MG/1
4 TABLET, FILM COATED ORAL EVERY 6 HOURS PRN
Status: DISCONTINUED | OUTPATIENT
Start: 2020-11-16 | End: 2020-11-17 | Stop reason: HOSPADM

## 2020-11-16 RX ORDER — OXYCODONE AND ACETAMINOPHEN 10; 325 MG/1; MG/1
1 TABLET ORAL ONCE AS NEEDED
Status: COMPLETED | OUTPATIENT
Start: 2020-11-16 | End: 2020-11-16

## 2020-11-16 RX ORDER — LABETALOL HYDROCHLORIDE 5 MG/ML
INJECTION, SOLUTION INTRAVENOUS AS NEEDED
Status: DISCONTINUED | OUTPATIENT
Start: 2020-11-16 | End: 2020-11-16 | Stop reason: SURG

## 2020-11-16 RX ORDER — BUPIVACAINE HYDROCHLORIDE 5 MG/ML
INJECTION, SOLUTION PERINEURAL AS NEEDED
Status: DISCONTINUED | OUTPATIENT
Start: 2020-11-16 | End: 2020-11-16 | Stop reason: HOSPADM

## 2020-11-16 RX ORDER — ROSUVASTATIN CALCIUM 10 MG/1
10 TABLET, COATED ORAL NIGHTLY
Status: DISCONTINUED | OUTPATIENT
Start: 2020-11-16 | End: 2020-11-17 | Stop reason: HOSPADM

## 2020-11-16 RX ORDER — LABETALOL HYDROCHLORIDE 5 MG/ML
20 INJECTION, SOLUTION INTRAVENOUS
Status: DISCONTINUED | OUTPATIENT
Start: 2020-11-16 | End: 2020-11-17 | Stop reason: HOSPADM

## 2020-11-16 RX ORDER — LIDOCAINE HYDROCHLORIDE 40 MG/ML
SOLUTION TOPICAL AS NEEDED
Status: DISCONTINUED | OUTPATIENT
Start: 2020-11-16 | End: 2020-11-16 | Stop reason: SURG

## 2020-11-16 RX ORDER — ASPIRIN 81 MG/1
81 TABLET ORAL DAILY
Status: DISCONTINUED | OUTPATIENT
Start: 2020-11-17 | End: 2020-11-17 | Stop reason: HOSPADM

## 2020-11-16 RX ORDER — SODIUM CHLORIDE 0.9 % (FLUSH) 0.9 %
10 SYRINGE (ML) INJECTION AS NEEDED
Status: DISCONTINUED | OUTPATIENT
Start: 2020-11-16 | End: 2020-11-17 | Stop reason: HOSPADM

## 2020-11-16 RX ORDER — FENTANYL CITRATE 50 UG/ML
INJECTION, SOLUTION INTRAMUSCULAR; INTRAVENOUS AS NEEDED
Status: DISCONTINUED | OUTPATIENT
Start: 2020-11-16 | End: 2020-11-16 | Stop reason: SURG

## 2020-11-16 RX ORDER — SODIUM CHLORIDE, SODIUM LACTATE, POTASSIUM CHLORIDE, CALCIUM CHLORIDE 600; 310; 30; 20 MG/100ML; MG/100ML; MG/100ML; MG/100ML
100 INJECTION, SOLUTION INTRAVENOUS CONTINUOUS
Status: DISCONTINUED | OUTPATIENT
Start: 2020-11-16 | End: 2020-11-16 | Stop reason: HOSPADM

## 2020-11-16 RX ORDER — ASCORBIC ACID 500 MG
500 TABLET ORAL DAILY
Status: DISCONTINUED | OUTPATIENT
Start: 2020-11-17 | End: 2020-11-17 | Stop reason: HOSPADM

## 2020-11-16 RX ORDER — ALBUTEROL SULFATE 90 UG/1
2 AEROSOL, METERED RESPIRATORY (INHALATION) DAILY
Status: DISCONTINUED | OUTPATIENT
Start: 2020-11-17 | End: 2020-11-16 | Stop reason: SDUPTHER

## 2020-11-16 RX ORDER — PROPOFOL 10 MG/ML
VIAL (ML) INTRAVENOUS AS NEEDED
Status: DISCONTINUED | OUTPATIENT
Start: 2020-11-16 | End: 2020-11-16 | Stop reason: SURG

## 2020-11-16 RX ORDER — ALBUTEROL SULFATE 2.5 MG/3ML
2.5 SOLUTION RESPIRATORY (INHALATION)
Status: DISCONTINUED | OUTPATIENT
Start: 2020-11-17 | End: 2020-11-17 | Stop reason: HOSPADM

## 2020-11-16 RX ORDER — LIDOCAINE HYDROCHLORIDE 10 MG/ML
0.5 INJECTION, SOLUTION EPIDURAL; INFILTRATION; INTRACAUDAL; PERINEURAL ONCE AS NEEDED
Status: DISCONTINUED | OUTPATIENT
Start: 2020-11-16 | End: 2020-11-16 | Stop reason: SDUPTHER

## 2020-11-16 RX ORDER — HYDROMORPHONE HYDROCHLORIDE 1 MG/ML
0.5 INJECTION, SOLUTION INTRAMUSCULAR; INTRAVENOUS; SUBCUTANEOUS
Status: DISCONTINUED | OUTPATIENT
Start: 2020-11-16 | End: 2020-11-16 | Stop reason: HOSPADM

## 2020-11-16 RX ORDER — ACETAMINOPHEN 500 MG
1000 TABLET ORAL ONCE
Status: COMPLETED | OUTPATIENT
Start: 2020-11-16 | End: 2020-11-16

## 2020-11-16 RX ORDER — NICARDIPINE HYDROCHLORIDE 2.5 MG/ML
INJECTION INTRAVENOUS AS NEEDED
Status: DISCONTINUED | OUTPATIENT
Start: 2020-11-16 | End: 2020-11-16 | Stop reason: SURG

## 2020-11-16 RX ORDER — SODIUM CHLORIDE 9 MG/ML
50 INJECTION, SOLUTION INTRAVENOUS CONTINUOUS
Status: DISCONTINUED | OUTPATIENT
Start: 2020-11-16 | End: 2020-11-17 | Stop reason: HOSPADM

## 2020-11-16 RX ORDER — ONDANSETRON 2 MG/ML
4 INJECTION INTRAMUSCULAR; INTRAVENOUS AS NEEDED
Status: DISCONTINUED | OUTPATIENT
Start: 2020-11-16 | End: 2020-11-16 | Stop reason: HOSPADM

## 2020-11-16 RX ORDER — CLOPIDOGREL BISULFATE 75 MG/1
75 TABLET ORAL DAILY
Status: DISCONTINUED | OUTPATIENT
Start: 2020-11-17 | End: 2020-11-17 | Stop reason: HOSPADM

## 2020-11-16 RX ORDER — NALOXONE HCL 0.4 MG/ML
0.04 VIAL (ML) INJECTION AS NEEDED
Status: DISCONTINUED | OUTPATIENT
Start: 2020-11-16 | End: 2020-11-16 | Stop reason: HOSPADM

## 2020-11-16 RX ORDER — HEPARIN SODIUM 1000 [USP'U]/ML
INJECTION, SOLUTION INTRAVENOUS; SUBCUTANEOUS AS NEEDED
Status: DISCONTINUED | OUTPATIENT
Start: 2020-11-16 | End: 2020-11-16 | Stop reason: SURG

## 2020-11-16 RX ORDER — LIDOCAINE HYDROCHLORIDE 10 MG/ML
INJECTION, SOLUTION INFILTRATION; PERINEURAL AS NEEDED
Status: DISCONTINUED | OUTPATIENT
Start: 2020-11-16 | End: 2020-11-16 | Stop reason: HOSPADM

## 2020-11-16 RX ORDER — LIDOCAINE HYDROCHLORIDE 10 MG/ML
0.5 INJECTION, SOLUTION EPIDURAL; INFILTRATION; INTRACAUDAL; PERINEURAL ONCE AS NEEDED
Status: DISCONTINUED | OUTPATIENT
Start: 2020-11-16 | End: 2020-11-16 | Stop reason: HOSPADM

## 2020-11-16 RX ORDER — SODIUM CHLORIDE, SODIUM LACTATE, POTASSIUM CHLORIDE, CALCIUM CHLORIDE 600; 310; 30; 20 MG/100ML; MG/100ML; MG/100ML; MG/100ML
1000 INJECTION, SOLUTION INTRAVENOUS CONTINUOUS
Status: DISCONTINUED | OUTPATIENT
Start: 2020-11-16 | End: 2020-11-16 | Stop reason: HOSPADM

## 2020-11-16 RX ORDER — NALOXONE HCL 0.4 MG/ML
0.4 VIAL (ML) INJECTION
Status: DISCONTINUED | OUTPATIENT
Start: 2020-11-16 | End: 2020-11-17 | Stop reason: HOSPADM

## 2020-11-16 RX ORDER — SODIUM CHLORIDE 0.9 % (FLUSH) 0.9 %
3 SYRINGE (ML) INJECTION EVERY 12 HOURS SCHEDULED
Status: DISCONTINUED | OUTPATIENT
Start: 2020-11-16 | End: 2020-11-17 | Stop reason: HOSPADM

## 2020-11-16 RX ORDER — SODIUM CHLORIDE 0.9 % (FLUSH) 0.9 %
3 SYRINGE (ML) INJECTION EVERY 12 HOURS SCHEDULED
Status: DISCONTINUED | OUTPATIENT
Start: 2020-11-16 | End: 2020-11-16 | Stop reason: HOSPADM

## 2020-11-16 RX ORDER — FENTANYL CITRATE 50 UG/ML
25 INJECTION, SOLUTION INTRAMUSCULAR; INTRAVENOUS
Status: DISCONTINUED | OUTPATIENT
Start: 2020-11-16 | End: 2020-11-16 | Stop reason: HOSPADM

## 2020-11-16 RX ORDER — ROCURONIUM BROMIDE 10 MG/ML
INJECTION, SOLUTION INTRAVENOUS AS NEEDED
Status: DISCONTINUED | OUTPATIENT
Start: 2020-11-16 | End: 2020-11-16 | Stop reason: SURG

## 2020-11-16 RX ORDER — ONDANSETRON 2 MG/ML
4 INJECTION INTRAMUSCULAR; INTRAVENOUS EVERY 6 HOURS PRN
Status: DISCONTINUED | OUTPATIENT
Start: 2020-11-16 | End: 2020-11-17 | Stop reason: HOSPADM

## 2020-11-16 RX ORDER — SODIUM CHLORIDE 0.9 % (FLUSH) 0.9 %
10 SYRINGE (ML) INJECTION AS NEEDED
Status: DISCONTINUED | OUTPATIENT
Start: 2020-11-16 | End: 2020-11-16 | Stop reason: HOSPADM

## 2020-11-16 RX ORDER — LABETALOL HYDROCHLORIDE 5 MG/ML
5 INJECTION, SOLUTION INTRAVENOUS
Status: DISCONTINUED | OUTPATIENT
Start: 2020-11-16 | End: 2020-11-16 | Stop reason: HOSPADM

## 2020-11-16 RX ORDER — HYDROCODONE BITARTRATE AND ACETAMINOPHEN 5; 325 MG/1; MG/1
1 TABLET ORAL EVERY 4 HOURS PRN
Status: DISCONTINUED | OUTPATIENT
Start: 2020-11-16 | End: 2020-11-17 | Stop reason: HOSPADM

## 2020-11-16 RX ORDER — BUPIVACAINE HCL/0.9 % NACL/PF 0.1 %
2 PLASTIC BAG, INJECTION (ML) EPIDURAL EVERY 8 HOURS
Status: COMPLETED | OUTPATIENT
Start: 2020-11-17 | End: 2020-11-17

## 2020-11-16 RX ORDER — BUPIVACAINE HCL/0.9 % NACL/PF 0.1 %
2 PLASTIC BAG, INJECTION (ML) EPIDURAL ONCE
Status: COMPLETED | OUTPATIENT
Start: 2020-11-16 | End: 2020-11-16

## 2020-11-16 RX ORDER — SODIUM CHLORIDE 0.9 % (FLUSH) 0.9 %
3 SYRINGE (ML) INJECTION AS NEEDED
Status: DISCONTINUED | OUTPATIENT
Start: 2020-11-16 | End: 2020-11-16 | Stop reason: HOSPADM

## 2020-11-16 RX ADMIN — Medication 2 G: at 16:57

## 2020-11-16 RX ADMIN — ROSUVASTATIN CALCIUM 10 MG: 10 TABLET, FILM COATED ORAL at 21:38

## 2020-11-16 RX ADMIN — PHENYLEPHRINE HYDROCHLORIDE 1 MCG/KG/MIN: 10 INJECTION INTRAVENOUS at 17:05

## 2020-11-16 RX ADMIN — OXYCODONE HYDROCHLORIDE AND ACETAMINOPHEN 1 TABLET: 10; 325 TABLET ORAL at 19:04

## 2020-11-16 RX ADMIN — HYDROMORPHONE HYDROCHLORIDE 0.5 MG: 1 INJECTION, SOLUTION INTRAMUSCULAR; INTRAVENOUS; SUBCUTANEOUS at 18:37

## 2020-11-16 RX ADMIN — HYDROCODONE BITARTRATE AND ACETAMINOPHEN 1 TABLET: 5; 325 TABLET ORAL at 20:15

## 2020-11-16 RX ADMIN — SODIUM CHLORIDE, POTASSIUM CHLORIDE, SODIUM LACTATE AND CALCIUM CHLORIDE 1000 ML: 600; 310; 30; 20 INJECTION, SOLUTION INTRAVENOUS at 09:55

## 2020-11-16 RX ADMIN — ONDANSETRON 4 MG: 2 INJECTION INTRAMUSCULAR; INTRAVENOUS at 18:36

## 2020-11-16 RX ADMIN — ACETAMINOPHEN 1000 MG: 500 TABLET, FILM COATED ORAL at 14:25

## 2020-11-16 RX ADMIN — NICARDIPINE HYDROCHLORIDE 500 MCG: 25 INJECTION INTRAVENOUS at 18:11

## 2020-11-16 RX ADMIN — FENTANYL CITRATE 100 MCG: 50 INJECTION, SOLUTION INTRAMUSCULAR; INTRAVENOUS at 16:55

## 2020-11-16 RX ADMIN — ROCURONIUM BROMIDE 50 MG: 10 INJECTION INTRAVENOUS at 16:55

## 2020-11-16 RX ADMIN — HEPARIN SODIUM 7000 UNITS: 1000 INJECTION, SOLUTION INTRAVENOUS; SUBCUTANEOUS at 17:22

## 2020-11-16 RX ADMIN — LIDOCAINE HYDROCHLORIDE 50 MG: 20 INJECTION, SOLUTION INTRAVENOUS at 16:55

## 2020-11-16 RX ADMIN — SODIUM CHLORIDE, PRESERVATIVE FREE 3 ML: 5 INJECTION INTRAVENOUS at 20:16

## 2020-11-16 RX ADMIN — LABETALOL 20 MG/4 ML (5 MG/ML) INTRAVENOUS SYRINGE 10 MG: at 18:10

## 2020-11-16 RX ADMIN — PROPOFOL 100 MG: 10 INJECTION, EMULSION INTRAVENOUS at 16:55

## 2020-11-16 RX ADMIN — SODIUM CHLORIDE 50 ML/HR: 9 INJECTION, SOLUTION INTRAVENOUS at 20:14

## 2020-11-16 RX ADMIN — LIDOCAINE HYDROCHLORIDE 1 EACH: 40 SOLUTION TOPICAL at 16:55

## 2020-11-16 RX ADMIN — SUGAMMADEX 200 MG: 100 INJECTION, SOLUTION INTRAVENOUS at 18:17

## 2020-11-16 NOTE — ANESTHESIA PROCEDURE NOTES
Airway  Urgency: elective    Date/Time: 11/16/2020 4:55 PM  Airway not difficult    General Information and Staff    Patient location during procedure: OR  CRNA: Graham Gupta CRNA    Indications and Patient Condition  Indications for airway management: airway protection    Preoxygenated: yes  MILS maintained throughout  Mask difficulty assessment: 1 - vent by mask    Final Airway Details  Final airway type: endotracheal airway      Successful airway: ETT  Cuffed: yes   Successful intubation technique: direct laryngoscopy  Facilitating devices/methods: intubating stylet  Endotracheal tube insertion site: oral  Blade: Jl  Blade size: 4  ETT size (mm): 8.0  Cormack-Lehane Classification: grade IIa - partial view of glottis  Placement verified by: chest auscultation and capnometry   Cuff volume (mL): 8  Measured from: lips  ETT/EBT  to lips (cm): 21  Number of attempts at approach: 1  Assessment: lips, teeth, and gum same as pre-op and atraumatic intubation

## 2020-11-16 NOTE — H&P
11/15/2020         Rigo Negrete MD  [address] [place] [address]     Ranjith Rice  1940          Chief Complaint   Patient presents with   • Establish Care       Bilateral carotid stenosis.  Pt had testing on 20 in [place], [place].  Pt had a CT done on 10/12/20 in [place].  Pt denies any stroke like symptoms.  Pt referred by Dr. Rigo Negrete.   • Med Management       Verbally went over the patient's medications with him.  Pt is taking a low dose aspirin and Crestor.        Dear Rigo Negrete MD:        HPI  I had the pleasure of seeing your patient Ranjith Rice in the office today.  Thank you kindly for this consultation.  As you recall, Ranjith Rice is a 80 y.o.  male who you are currently following for routine health maintenance.  He denies strokelike symptoms, but was recently having trouble breathing.  Upon exam, he was found to have a carotid bruit and sent for testing.  He had a carotid duplex and CTA of the neck at an outside facility, showing significant right carotid stenosis of 90%.  He is maintained on aspirin and Crestor.       Medical History        Past Medical History:   Diagnosis Date   • Hypercholesteremia             Surgical History         Past Surgical History:   Procedure Laterality Date   • KNEE SURGERY Left             History reviewed. No pertinent family history.     Social History   Social History            Socioeconomic History   • Marital status: Single       Spouse name: Not on file   • Number of children: Not on file   • Years of education: Not on file   • Highest education level: Not on file   Tobacco Use   • Smoking status: Former Smoker       Types: Cigarettes       Quit date:        Years since quittin.7   • Smokeless tobacco: Never Used   Substance and Sexual Activity   • Alcohol use: Not Currently   • Drug use: Never   • Sexual activity: Defer           No Known Allergies          Current Outpatient Medications   Medication Instructions   • Ascorbic  "Acid (VITAMIN C ER PO) Oral   • aspirin 81 mg, Oral, Daily   • Flaxseed, Linseed, (FLAX SEED OIL PO) Oral   • Multiple Vitamins-Minerals (multivitamin with minerals) tablet tablet 1 tablet, Oral, Daily   • rosuvastatin (CRESTOR) 10 MG tablet No dose, route, or frequency recorded.   • VITAMIN E PO Oral           Review of Systems   Constitutional: Negative.    HENT: Negative.    Eyes: Negative.    Respiratory: Negative.    Cardiovascular: Negative.    Gastrointestinal: Negative.    Endocrine: Negative.    Genitourinary: Negative.    Musculoskeletal: Negative.    Skin: Negative.    Allergic/Immunologic: Negative.    Neurological: Negative.    Hematological: Negative.    Psychiatric/Behavioral: Negative.    All other systems reviewed and are negative.        /86   Pulse 61   Ht 177.8 cm (70\")   Wt 83 kg (183 lb)   SpO2 95%   BMI 26.26 kg/m²   Physical Exam  Vitals signs and nursing note reviewed.   Constitutional:       Appearance: He is well-developed.   HENT:      Head: Normocephalic and atraumatic.   Eyes:      General: No scleral icterus.     Pupils: Pupils are equal, round, and reactive to light.   Neck:      Musculoskeletal: Neck supple.      Thyroid: No thyromegaly.      Vascular: No carotid bruit or JVD.   Cardiovascular:      Rate and Rhythm: Normal rate and regular rhythm.      Pulses:           Carotid pulses are 2+ on the right side and 2+ on the left side.       Femoral pulses are 2+ on the right side and 2+ on the left side.       Popliteal pulses are 2+ on the right side and 2+ on the left side.        Dorsalis pedis pulses are 2+ on the right side and 2+ on the left side.        Posterior tibial pulses are 2+ on the right side and 2+ on the left side.      Heart sounds: Normal heart sounds.   Pulmonary:      Effort: Pulmonary effort is normal.      Breath sounds: Normal breath sounds.   Abdominal:      General: Bowel sounds are normal. There is no distension or abdominal bruit.      " Palpations: Abdomen is soft. There is no mass.      Tenderness: There is no abdominal tenderness.   Musculoskeletal: Normal range of motion.   Lymphadenopathy:      Cervical: No cervical adenopathy.   Skin:     General: Skin is warm and dry.   Neurological:      Mental Status: He is alert and oriented to person, place, and time.      Cranial Nerves: No cranial nerve deficit.      Sensory: No sensory deficit.   Psychiatric:         Mood and Affect: Mood normal.         Behavior: Behavior normal.         Thought Content: Thought content normal.         Judgment: Judgment normal.                           Patient Active Problem List   Diagnosis   • Hypercholesteremia   • Bilateral carotid artery stenosis           ICD-10-CM ICD-9-CM   1. Bilateral carotid artery stenosis  I65.23 433.10       433.30   2. Hypercholesteremia  E78.00 272.0        Plan: After thoroughly evaluating Ranjith Rice, I believe the best course of action is to proceed with a right carotid endarterectomy for stroke risk reduction.  Patient does have significant right-sided carotid occlusive disease confirmed on CTA. Risks of carotid endarterectomy include, but are not limited to, bleeding, infection, vessel damage, nerve damage, MI, stroke, and death.  The patient understands these risks and would like to proceed with the procedure.He has been cleared by cardiology.  I did discuss vascular risk factors as they pertain to the progression of vascular disease including controlling hypercholesterolemia.  This risk factors currently controlled and stable.  The patient can continue taking their current medication regimen as previously planned.  This was all discussed in full with complete understanding.     Thank you for allowing me to participate in the care of your patient.  Please do not hesitate with any questions or concerns.  I will keep you aware of any further encounters with Ranjith Rice.           Sincerely yours,           Gabriel Wright,  DO

## 2020-11-16 NOTE — ANESTHESIA PREPROCEDURE EVALUATION
Anesthesia Evaluation     Patient summary reviewed   no history of anesthetic complications:  NPO Solid Status: > 8 hours  NPO Liquid Status: > 8 hours           Airway   Mallampati: II  TM distance: >3 FB  Neck ROM: full  No difficulty expected  Dental          Pulmonary    (+) a smoker Former, COPD,   (-) sleep apnea  Cardiovascular   Exercise tolerance: good (4-7 METS)    ECG reviewed    (+) valvular problems/murmurs (moderate) AS, hyperlipidemia,  carotid artery disease    ROS comment: Echo:  · Left ventricular ejection fraction appears to be 61 - 65%.  · No evidence of pulmonary hypertension is present.  · Moderate aortic valve stenosis is present.  · Aortic valve not well visualized.  · Overall limited data quality    Neuro/Psych  (-) seizures, TIA, CVA  GI/Hepatic/Renal/Endo    (-) liver disease, no renal disease, diabetes    Musculoskeletal     Abdominal    Substance History      OB/GYN          Other                        Anesthesia Plan    ASA 3     general   (Arterial line    Stat potassium level)  intravenous induction     Anesthetic plan, all risks, benefits, and alternatives have been provided, discussed and informed consent has been obtained with: patient.  Use of blood products discussed with patient  Consented to blood products.

## 2020-11-16 NOTE — ANESTHESIA PROCEDURE NOTES
Arterial Line      Patient location during procedure: pre-op  Start time: 11/16/2020 2:32 PM  Stop Time:11/16/2020 2:33 PM       Line placed for hemodynamic monitoring, ABGs/Labs/ISTAT and MD/Surgeon request.  Performed By   Anesthesiologist: Deepti Trujillo MD  Preanesthetic Checklist  Completed: patient identified, site marked, surgical consent, pre-op evaluation, timeout performed, IV checked, risks and benefits discussed and monitors and equipment checked  Arterial Line Prep   Sterile Tech: gloves, sterile barriers and cap  Prep: ChloraPrep  Patient monitoring: blood pressure monitoring, continuous pulse oximetry and EKG  Arterial Line Procedure   Laterality:left  Location:  radial artery  Catheter size: 20 G   Guidance: ultrasound guided  PROCEDURE NOTE/ULTRASOUND INTERPRETATION.  Using ultrasound guidance the potential vascular sites for insertion of the catheter were visualized to determine the patency of the vessel to be used for vascular access.  After selecting the appropriate site for insertion, the needle was visualized under ultrasound being inserted into the radial artery, followed by ultrasound confirmation of wire and catheter placement. There were no abnormalities seen on ultrasound; an image was taken; and the patient tolerated the procedure with no complications.   Number of attempts: 1  Successful placement: yes  Post Assessment   Dressing Type: occlusive dressing applied, secured with tape and wrist guard applied.   Complications no  Circ/Move/Sens Assessment: normal and unchanged.   Patient Tolerance: patient tolerated the procedure well with no apparent complications

## 2020-11-17 VITALS
SYSTOLIC BLOOD PRESSURE: 141 MMHG | OXYGEN SATURATION: 91 % | HEART RATE: 70 BPM | BODY MASS INDEX: 26.83 KG/M2 | HEIGHT: 68 IN | TEMPERATURE: 98.1 F | DIASTOLIC BLOOD PRESSURE: 81 MMHG | WEIGHT: 177 LBS | RESPIRATION RATE: 16 BRPM

## 2020-11-17 PROCEDURE — 99024 POSTOP FOLLOW-UP VISIT: CPT | Performed by: NURSE PRACTITIONER

## 2020-11-17 PROCEDURE — 25010000002 CEFAZOLIN PER 500 MG: Performed by: SURGERY

## 2020-11-17 PROCEDURE — 94799 UNLISTED PULMONARY SVC/PX: CPT

## 2020-11-17 RX ORDER — CLOPIDOGREL BISULFATE 75 MG/1
75 TABLET ORAL DAILY
Qty: 30 TABLET | Refills: 5 | Status: SHIPPED | OUTPATIENT
Start: 2020-11-17 | End: 2021-01-01

## 2020-11-17 RX ADMIN — ALBUTEROL SULFATE 2.5 MG: 2.5 SOLUTION RESPIRATORY (INHALATION) at 10:23

## 2020-11-17 RX ADMIN — ASPIRIN 81 MG: 81 TABLET, COATED ORAL at 08:24

## 2020-11-17 RX ADMIN — CLOPIDOGREL BISULFATE 75 MG: 75 TABLET, FILM COATED ORAL at 08:24

## 2020-11-17 RX ADMIN — CEFAZOLIN SODIUM 2 G: 10 INJECTION, POWDER, FOR SOLUTION INTRAVENOUS at 08:24

## 2020-11-17 RX ADMIN — OXYCODONE HYDROCHLORIDE AND ACETAMINOPHEN 500 MG: 500 TABLET ORAL at 08:24

## 2020-11-17 RX ADMIN — CEFAZOLIN SODIUM 2 G: 10 INJECTION, POWDER, FOR SOLUTION INTRAVENOUS at 01:09

## 2020-11-17 NOTE — ANESTHESIA POSTPROCEDURE EVALUATION
"Patient: Ranjith Rice    Procedure Summary     Date: 11/16/20 Room / Location: [facility] OR  /  PAD HYBRID OR 12    Anesthesia Start: 1650 Anesthesia Stop: 1832    Procedure: RIGHT CAROTID ENDARTERECTOMY WITH EEG (Right Neck) Diagnosis:       Bilateral carotid artery stenosis      Preop testing      (Bilateral carotid artery stenosis [I65.23])      (Preop testing [Z01.818])    Surgeon: Gabriel Wright DO Provider: Graham Gupta CRNA    Anesthesia Type: general ASA Status: 3          Anesthesia Type: general    Vitals  Vitals Value Taken Time   /87 11/16/20 1930   Temp 98 °F (36.7 °C) 11/16/20 1918   Pulse 72 11/16/20 1935   Resp 17 11/16/20 1930   SpO2 95 % 11/16/20 1935   Vitals shown include unvalidated device data.        Post Anesthesia Care and Evaluation    PONV Status: none  Comments: Patient d/c from PACU prior to anes eval based on Hayde score.  Please see RN notes for details of d/c criteria.    Blood pressure 151/66, pulse 58, temperature 97.5 °F (36.4 °C), temperature source Oral, resp. rate 16, height 172.7 cm (68\"), weight 80.3 kg (177 lb), SpO2 94 %.          "

## 2020-11-17 NOTE — OP NOTE
Ranjith Rice  11/16/2020     PREOPERATIVE DIAGNOSIS: Bilateral carotid artery stenosis [I65.23]  Preop testing [Z01.818]     POSTOPERATIVE DIAGNOSIS: Post-Op Diagnosis Codes:     * Bilateral carotid artery stenosis [I65.23]     * Preop testing [Z01.818]     PROCEDURE PERFORMED:   1.  Right carotid endarterectomy with bovine patch angioplasty  2.  Continuous EEG monitoring  3.  Completion arterial duplex     SURGEON: Gabriel Wright DO   Assistant: Barrington Banda MD     ANESTHESIA: General.    PREPARATION: Routine.    STAFF: Circulator: Almita Smyth RN  Scrub Person: Leida Andrews; Crystal Irene  Vendor Representative: Isabel Garcia MD    Estimated Blood Loss: 100ml    SPECIMENS: Carotid plaque    COMPLICATIONS: None    INDICATIONS: Ranjith Rice is a 80 y.o. male who edenies strokelike symptoms, but was recently having trouble breathing.  Upon exam, he was found to have a carotid bruit and sent for testing.  He had a carotid duplex and CTA of the neck at an outside facility, showing significant right carotid stenosis of 90%.  He is maintained on aspirin and Crestor.   The indications, risks, and possible complications of the procedure were explained to the patient, who voiced understanding and wished to proceed with surgery.     PROCEDURE IN DETAIL:   The patient was taken to the operating room and placed on the operating table in a supine position. After general anesthesia was obtained, the right neck and chest was prepped and draped in a sterile manner.  A longitudinal incision was then made along the anterior border the sternocleidomastoid muscle.  Careful dissection was made down through the subcutaneous tissues using the Bovie cautery to ensure hemostasis.  Any crossing veins were ligated with 3-0 silk suture and hemoclips.  The sternocleidomastoid muscle was retracted laterally.  The carotid sheath was entered over the common carotid artery.  The Metzenbaum scissors were used to free up the  common carotid artery from its local attachments.  A large vessel loop was placed for proximal vascular control.  Dissection was then carried out distally along the carotid artery encountering the facial vein which was ligated with 2-0 silk suture.  This gave rise to the bifurcation.  The superior thyroid artery was identified and encircled with a 2-0 silk suture for vascular control.  The external carotid artery was dissected free and a large vessel loop was placed for vascular control.  The very distal internal carotid artery was carefully dissected free and a small vessel loop was placed for vascular control.  At this point full exposure was established.  The ansa cervicalis, hypoglossal, vagus nerves were all identified.  The patient was given 7000 units of intravenous heparin.  After 3 minutes the distal internal carotid artery was test clamped.  After 2 minutes there were no EEG monitor changes.  The common and external were then clamped in succession.  Using 11 blade an arteriotomy was made in the common carotid artery.  It was extended up along the distal internal carotid artery with the Bautista scissors.  There was a significant amount of heavy plaque burden in the proximal internal carotid artery.  Using the Homer elevator the standard endarterectomy was performed removing all the plaque burden.  Heparinized saline was used to irrigate the wound bed and all loose debris was picked clean.  The bovine pericardial patch was brought to the field and starting distally on the internal carotid artery the patch anastomosis was performed with a 6-0 Prolene in a running fashion.  Both sutures were brought down to the midline.  The patch was then appropriately fashioned proximally.  Starting with 5-0 Prolene the patch anastomosis was continued in a running fashion to meet in the midline.  Prior to completion of the patch anastomosis the appropriate flushing maneuvers were performed and anastomosis was completed.  Flow  was reestablished.  Hemostasis was observed.  A completion arterial duplex was performed which showed adequate flow velocities in the common, internal, and external carotid arteries.  There was no evidence of flap formation, debris, or thrombosis.  At this point I felt the result was excellent and no further intervention was warranted.  A separate stab incision was made on the inferior part of the neck and a 15 [place]olese round DOMINIQUE drain was placed.  It was secured to skin with a 2-0 nylon.  Gelfoam with thrombin was used to ensure hemostasis.  Antibiotic saline was used to irrigate the wound bed.  The platysma muscle was then reapproximated using a 3-0 Vicryl in a running fashion.  The skin was then anesthetized using 18 mL of 0.5% Marcaine plain.  The skin was then reapproximated using a 4-0 Monocryl in a subcuticular fashion.  The wound was then cleaned.   Sterile dressings were applied. The patient tolerated the procedure well. Sponge and needle counts were correct. The patient was then awakened and extubated in the operating room and taken to the recovery room in good condition.The patient awoke from anesthesia neurologically intact and there were no EEG monitor changes throughout.    Dr. Banda assisted and was present for the crucial parts of the procedure which included carotid endarterectomy and bovine patch angioplasty.    Gabriel Wright, DO  Date: 11/16/2020 Time: 18:24 CST    CC: Rigo Negrete MD

## 2020-11-17 NOTE — DISCHARGE SUMMARY
Date of Discharge:  11/17/2020    Discharge Diagnosis: Bilateral carotid artery stenosis [I65.23]    Presenting Problem/History of Present Illness  Bilateral carotid artery stenosis [I65.23]  Preop testing [Z01.818]  Bilateral carotid artery stenosis [I65.23]  Bilateral carotid artery stenosis [I65.23]       Hospital Course  Patient is a 80 y.o. male who edenies strokelike symptoms, but was recently having trouble breathing.  Upon exam, he was found to have a carotid bruit and sent for testing.  He had a carotid duplex and CTA of the neck at an outside facility, showing significant right carotid stenosis of 90%.  He did undergo a right carotid endarterectomy without incident.   He was transferred to  for continued care.  Overnight, he has done well.  His vitals have remained stable, right neck soft without hematoma, and he remains neurologically intact.  He is stable and ready for discharge.  We will see him back in 2 weeks for post operative follow up.  I did remove his [place] drain.  His medications will stay the same with the addition of Plavix.  Written and verbal instructions were given.  This all was discussed in full with complete understanding.       Procedures Performed  Procedure(s):  RIGHT CAROTID ENDARTERECTOMY WITH EEG       Consults:   Consults     No orders found for last 30 day(s).            Condition on Discharge: Stable    Discharge Medications     Discharge Medications      New Medications      Instructions Start Date   clopidogrel 75 MG tablet  Commonly known as: PLAVIX   75 mg, Oral, Daily         Continue These Medications      Instructions Start Date   albuterol sulfate  (90 Base) MCG/ACT inhaler  Commonly known as: PROVENTIL HFA;VENTOLIN HFA;PROAIR HFA   2 puffs, Inhalation, Daily      aspirin 81 MG EC tablet   81 mg, Oral, Daily      FLAX SEED OIL PO   Oral, Daily      Garlic 10 MG capsule   Oral, Daily      multivitamin with minerals tablet tablet   1 tablet, Oral,  Daily      rosuvastatin 10 MG tablet  Commonly known as: CRESTOR   10 mg, Nightly      VITAMIN C ER PO   Oral, Daily      VITAMIN E PO   1 capsule, Oral, Daily             Discharge Diet:   Diet Instructions     Diet: Regular; Thin      Discharge Diet: Regular    Fluid Consistency: Thin          Activity at Discharge:   Activity Instructions     Bathing Restrictions      Type of Restriction: Bathing    Bathing Restrictions: Other    Explain Bathing Restrictions: may shower carolynn    Driving Restrictions      Type of Restriction: Driving    Driving Restrictions: No Driving (Time Limited)    Length: 1 Week    Lifting Restrictions      Type of Restriction: Lifting    Lifting Restrictions: Lifting Restriction (Indicate Limit)    Weight Limit (Pounds): 10    Length of Lifting Restriction: 1 week    Other Activity Restrictions      Type of Restriction: Other    Explain Other Restrictions: no bending, stooping, or straining          Follow-up Appointments  Future Appointments   Date Time Provider Department Center   4/19/2021 10:30 AM Ricky Mckeon MD [facility]     Additional Instructions for the Follow-ups that You Need to Schedule     Discharge Follow-up with Specialty: Dr. Wright; 2 Weeks   As directed      Specialty: Dr. Wright    Follow Up: 2 Weeks    Follow Up Details: post op                I did spend more than 30 minutes reviewing the chart, face to face encounter, and organizing discharge.    Tracey Henao, APRN  11/17/20  08:02 CST

## 2020-11-17 NOTE — PLAN OF CARE
Goal Outcome Evaluation:  Plan of Care Reviewed With: patient  Progress: no change  Outcome Summary: Pt c/o pain in right jaw/teeth upon arrival to floor, he had been medicated in PACU with minimal relief, I medicated him x1 with PO Norco with good relief, denies pain at 0400 round; IVF and IV abx initiated per order; VSS; neuros in tact; drsg c/d/i, neck soft; DOMINIQUE x1; voiding; no other issues at this time; will monitor.

## 2020-11-18 ENCOUNTER — APPOINTMENT (OUTPATIENT)
Dept: CARDIOLOGY | Facility: HOSPITAL | Age: 80
End: 2020-11-18

## 2020-11-19 LAB
LAB AP CASE REPORT: NORMAL
PATH REPORT.FINAL DX SPEC: NORMAL
PATH REPORT.GROSS SPEC: NORMAL

## 2020-12-01 ENCOUNTER — TELEPHONE (OUTPATIENT)
Dept: VASCULAR SURGERY | Facility: CLINIC | Age: 80
End: 2020-12-01

## 2020-12-01 NOTE — TELEPHONE ENCOUNTER
Left message reminding Mr Rice of his appointment for Wednesday, December 2nd, 2020 at 130 pm with Tracey KESSLER. Also advised Mr Rice if he had any questions, concerns, or needed to reschedule to please call the office at [phone].

## 2020-12-02 ENCOUNTER — OFFICE VISIT (OUTPATIENT)
Dept: VASCULAR SURGERY | Facility: CLINIC | Age: 80
End: 2020-12-02

## 2020-12-02 VITALS
SYSTOLIC BLOOD PRESSURE: 136 MMHG | HEART RATE: 62 BPM | OXYGEN SATURATION: 97 % | BODY MASS INDEX: 25.77 KG/M2 | HEIGHT: 69 IN | WEIGHT: 174 LBS | DIASTOLIC BLOOD PRESSURE: 86 MMHG

## 2020-12-02 DIAGNOSIS — E78.00 HYPERCHOLESTEREMIA: ICD-10-CM

## 2020-12-02 DIAGNOSIS — I65.23 BILATERAL CAROTID ARTERY STENOSIS: Primary | ICD-10-CM

## 2020-12-02 PROCEDURE — 99024 POSTOP FOLLOW-UP VISIT: CPT | Performed by: NURSE PRACTITIONER

## 2020-12-02 NOTE — PROGRESS NOTES
"12/2/2020     Rigo Negrete MD  [address] [facility] [address]      Ranjith Rice  1940    Chief Complaint   Patient presents with   • Follow-up     2 Week Post-Op Follow up For RIGHT CAROTID ENDARTERECTOMY WITH EEG. Patient denies any stroke like symptoms.    • swelling     Patient states has some swelling that wont go down.    • Former Smoker     Patient is a Former Smoker - Quit 2015   • Med Management     Verbally verified medications with patient.        Dear Rigo Negrete MD       HPI  I had the pleasure of seeing your patient Ranjith Rice in the office today.As you recall, Ranjith Rice is a 80 y.o.  male who you are currently following for routine health maintenance.  He denies strokelike symptoms, but was recently having trouble breathing.  Upon exam, he was found to have a carotid bruit and sent for testing.  He did have testing finding significant right carotid stenosis.  He is maintained on aspirin, Plavix, and Crestor.  He did undergo a right carotid endarterectomy on 11/16/2020.      Review of Systems   Constitutional: Negative.    HENT: Negative.    Eyes: Negative.    Respiratory: Negative.    Cardiovascular: Negative.    Gastrointestinal: Negative.    Endocrine: Negative.    Genitourinary: Negative.    Musculoskeletal: Negative.    Skin: Negative.    Allergic/Immunologic: Negative.    Neurological: Negative.    Hematological: Negative.    Psychiatric/Behavioral: Negative.    All other systems reviewed and are negative.      /86 (BP Location: Right arm, Patient Position: Sitting, Cuff Size: Adult)   Pulse 62   Ht 175.3 cm (69\")   Wt 78.9 kg (174 lb)   SpO2 97%   BMI 25.70 kg/m²   Physical Exam  Vitals signs and nursing note reviewed.   Constitutional:       General: He is not in acute distress.     Appearance: Normal appearance. He is well-developed and normal weight. He is not diaphoretic.   HENT:      Head: Normocephalic and atraumatic.   Neck:      Vascular: No carotid bruit " or JVD.      Comments: Right neck incision healed  Cardiovascular:      Rate and Rhythm: Normal rate and regular rhythm.      Pulses: Normal pulses.           Femoral pulses are 2+ on the right side and 2+ on the left side.       Popliteal pulses are 2+ on the right side and 2+ on the left side.        Dorsalis pedis pulses are 2+ on the right side and 2+ on the left side.        Posterior tibial pulses are 2+ on the right side and 2+ on the left side.      Heart sounds: Normal heart sounds, S1 normal and S2 normal. No murmur. No friction rub. No gallop.    Pulmonary:      Effort: Pulmonary effort is normal.      Breath sounds: Normal breath sounds.   Abdominal:      General: Bowel sounds are normal. There is no abdominal bruit.      Palpations: Abdomen is soft.      Tenderness: There is no abdominal tenderness.   Musculoskeletal: Normal range of motion.   Skin:     General: Skin is warm and dry.   Neurological:      General: No focal deficit present.      Mental Status: He is alert and oriented to person, place, and time.      Cranial Nerves: No cranial nerve deficit.   Psychiatric:         Mood and Affect: Mood normal.         Behavior: Behavior normal.         Thought Content: Thought content normal.         Judgment: Judgment normal.                Patient Active Problem List   Diagnosis   • Hypercholesteremia   • Bilateral carotid artery stenosis   • Preop cardiovascular exam   • Premature atrial complexes   • Ex-smoker for more than 1 year   • MANTILLA (dyspnea on exertion)   • Abnormal ECG   • Preop testing         ICD-10-CM ICD-9-CM   1. Bilateral carotid artery stenosis  I65.23 433.10     433.30   2. Hypercholesteremia  E78.00 272.0       Plan: After thoroughly evaluating Ranjith Rice, I am pleased to report he is doing well status post right carotid endarterectomy.  His right neck incision has healed.  He has some numbness along the incision and jaw to the chin but reports this is improving.  He remains  neurologically intact.  He is free to resume normal activity at this time.  We will see him back in 6 months with repeat noninvasive testing for continued surveillance, including a carotid duplex.  I did discuss vascular risk factors as they pertain to the progression of vascular disease including controlling hypercholesterolemia.  He is maintained on Crestor for that. Patient's Body mass index is 25.7 kg/m². BMI is within normal parameters. No follow-up required..  The patient can continue taking their current medication regimen as previously planned.  This was all discussed in full with complete understanding.    Thank you for allowing me to participate in the care of your patient.  Please do not hesitate with any questions or concerns.  I will keep you aware of any further encounters with Ranjith Rice.        Sincerely yours,         CHECO Bergman

## 2021-01-01 ENCOUNTER — APPOINTMENT (OUTPATIENT)
Dept: ULTRASOUND IMAGING | Facility: HOSPITAL | Age: 81
End: 2021-01-01

## 2021-01-01 ENCOUNTER — TELEPHONE (OUTPATIENT)
Dept: VASCULAR SURGERY | Facility: CLINIC | Age: 81
End: 2021-01-01

## 2021-01-01 ENCOUNTER — OFFICE VISIT (OUTPATIENT)
Dept: VASCULAR SURGERY | Facility: CLINIC | Age: 81
End: 2021-01-01

## 2021-01-01 ENCOUNTER — HOSPITAL ENCOUNTER (OUTPATIENT)
Dept: ULTRASOUND IMAGING | Facility: HOSPITAL | Age: 81
Discharge: HOME | End: 2021-12-08
Admitting: NURSE PRACTITIONER

## 2021-01-01 VITALS
SYSTOLIC BLOOD PRESSURE: 136 MMHG | WEIGHT: 155 LBS | DIASTOLIC BLOOD PRESSURE: 82 MMHG | HEIGHT: 70 IN | BODY MASS INDEX: 22.19 KG/M2

## 2021-01-01 DIAGNOSIS — I65.23 BILATERAL CAROTID ARTERY STENOSIS: Primary | ICD-10-CM

## 2021-01-01 DIAGNOSIS — E78.00 HYPERCHOLESTEREMIA: ICD-10-CM

## 2021-01-01 DIAGNOSIS — I65.23 BILATERAL CAROTID ARTERY STENOSIS: ICD-10-CM

## 2021-01-01 PROCEDURE — 99214 OFFICE O/P EST MOD 30 MIN: CPT | Performed by: NURSE PRACTITIONER

## 2021-01-01 PROCEDURE — 93880 EXTRACRANIAL BILAT STUDY: CPT | Performed by: SURGERY

## 2021-01-01 PROCEDURE — 93880 EXTRACRANIAL BILAT STUDY: CPT

## 2021-01-01 RX ORDER — CLOPIDOGREL BISULFATE 75 MG/1
75 TABLET ORAL DAILY
Qty: 30 TABLET | Refills: 5 | OUTPATIENT
Start: 2021-01-01 | End: 2022-01-01 | Stop reason: SDUPTHER

## 2021-06-01 ENCOUNTER — TELEPHONE (OUTPATIENT)
Dept: VASCULAR SURGERY | Facility: CLINIC | Age: 81
End: 2021-06-01

## 2021-06-01 NOTE — TELEPHONE ENCOUNTER
Left message reminding Mr Rice of his appointment for Wednesday, June 2nd, 2021. [facility] for 1 o'clock testing and follow up afterwards at 115 pm with Tracey KESSLER. Also advised Mr Rice if he had any questions, concerns or needs to reschedule to please call the office at [phone].

## 2021-06-02 ENCOUNTER — OFFICE VISIT (OUTPATIENT)
Dept: VASCULAR SURGERY | Facility: CLINIC | Age: 81
End: 2021-06-02

## 2021-06-02 ENCOUNTER — HOSPITAL ENCOUNTER (OUTPATIENT)
Dept: ULTRASOUND IMAGING | Facility: HOSPITAL | Age: 81
Discharge: HOME | End: 2021-06-02
Admitting: NURSE PRACTITIONER

## 2021-06-02 VITALS
OXYGEN SATURATION: 96 % | WEIGHT: 158 LBS | BODY MASS INDEX: 23.4 KG/M2 | HEIGHT: 69 IN | HEART RATE: 64 BPM | SYSTOLIC BLOOD PRESSURE: 134 MMHG | DIASTOLIC BLOOD PRESSURE: 86 MMHG

## 2021-06-02 DIAGNOSIS — I65.23 BILATERAL CAROTID ARTERY STENOSIS: ICD-10-CM

## 2021-06-02 DIAGNOSIS — E78.00 HYPERCHOLESTEREMIA: ICD-10-CM

## 2021-06-02 DIAGNOSIS — I65.23 BILATERAL CAROTID ARTERY STENOSIS: Primary | ICD-10-CM

## 2021-06-02 PROCEDURE — 99213 OFFICE O/P EST LOW 20 MIN: CPT | Performed by: NURSE PRACTITIONER

## 2021-06-02 PROCEDURE — 93880 EXTRACRANIAL BILAT STUDY: CPT

## 2021-06-02 PROCEDURE — 93880 EXTRACRANIAL BILAT STUDY: CPT | Performed by: SURGERY

## 2021-06-02 NOTE — PROGRESS NOTES
"6/2/2021     Rigo Negrete MD  [address] [facility] [address]      Ranjith Rice  1940    Chief Complaint   Patient presents with   • Follow-up     6 Month Follow Up For Bilateral Carotid Artery Stenosis. Test [id number] US pad carotid bilateral. Patient denies any stroke like symptoms.    • Former Smoker     Patient is a FOrmer Smoker - Quit 2015   • Med Management     Verbally verified medications with patient        Dear Rigo Negrete MD       HPI  I had the pleasure of seeing your patient Ranjith Rice in the office today.As you recall, Ranjith Rice is a 80 y.o.  male who we are following for asymptomatic carotid occlusive disease.  Currently, he is doing well denies any strokelike symptoms.  He did undergo a right carotid endarterectomy on 11/16/2020.  He is maintained on aspirin, Plavix, and Crestor.  He did have noninvasive testing performed today, which I did review in office.      Review of Systems   Constitutional: Negative.    HENT: Negative.    Eyes: Negative.    Respiratory: Negative.    Cardiovascular: Negative.    Gastrointestinal: Negative.    Endocrine: Negative.    Genitourinary: Negative.    Musculoskeletal: Negative.    Skin: Negative.    Allergic/Immunologic: Negative.    Neurological: Negative.    Hematological: Negative.    Psychiatric/Behavioral: Negative.    All other systems reviewed and are negative.       /86 (BP Location: Right arm, Patient Position: Sitting, Cuff Size: Adult)   Pulse 64   Ht 175.3 cm (69\")   Wt 71.7 kg (158 lb)   SpO2 96%   BMI 23.33 kg/m²   Physical Exam  Vitals and nursing note reviewed.   Constitutional:       General: He is not in acute distress.     Appearance: Normal appearance. He is well-developed and normal weight. He is not diaphoretic.   HENT:      Head: Normocephalic and atraumatic.   Eyes:      General: No scleral icterus.     Pupils: Pupils are equal, round, and reactive to light.   Neck:      Thyroid: No thyromegaly.      Vascular: No " carotid bruit or JVD.   Cardiovascular:      Rate and Rhythm: Normal rate and regular rhythm.      Pulses: Normal pulses.           Femoral pulses are 2+ on the right side and 2+ on the left side.       Popliteal pulses are 2+ on the right side and 2+ on the left side.        Dorsalis pedis pulses are 2+ on the right side and 2+ on the left side.        Posterior tibial pulses are 2+ on the right side and 2+ on the left side.      Heart sounds: Normal heart sounds, S1 normal and S2 normal. No murmur heard.   No friction rub. No gallop.    Pulmonary:      Effort: Pulmonary effort is normal.      Breath sounds: Normal breath sounds.   Abdominal:      General: Bowel sounds are normal. There is no abdominal bruit.      Palpations: Abdomen is soft.      Tenderness: There is no abdominal tenderness.   Musculoskeletal:         General: Normal range of motion.      Cervical back: Normal range of motion and neck supple.   Skin:     General: Skin is warm and dry.   Neurological:      General: No focal deficit present.      Mental Status: He is alert and oriented to person, place, and time.      Cranial Nerves: No cranial nerve deficit.   Psychiatric:         Mood and Affect: Mood normal.         Behavior: Behavior normal.         Thought Content: Thought content normal.         Judgment: Judgment normal.        Diagnostic Data:  Noninvasive testing including a carotid duplex shows 50 to 69% carotid stenosis bilaterally with antegrade right vertebral flow and to and fro flow in left vertebral.      Patient Active Problem List   Diagnosis   • Hypercholesteremia   • Bilateral carotid artery stenosis   • Preop cardiovascular exam   • Premature atrial complexes   • Ex-smoker for more than 1 year   • MANTILLA (dyspnea on exertion)   • Abnormal ECG   • Preop testing         ICD-10-CM ICD-9-CM   1. Bilateral carotid artery stenosis  I65.23 433.10     433.30   2. Hypercholesteremia  E78.00 272.0       Plan: After thoroughly evaluating Don  DEMETRICE Rice, I believe the best course of action is to remain conservative from vascular surgery standpoint.  Currently he is doing well and denies any strokelike symptoms.  I did review his testing which is stable with 50 to 69% carotid stenosis bilaterally.  We will see him back in 6 months with repeat noninvasive testing for continued surveillance, including a carotid duplex.   I did discuss vascular risk factors as they pertain to the progression of vascular disease including controlling hypercholesterolemia.  He is maintained on Crestor for that. Patient's Body mass index is 23.33 kg/m². BMI is within normal parameters. No follow-up required..  The patient can continue taking their current medication regimen as previously planned.  This was all discussed in full with complete understanding.    Thank you for allowing me to participate in the care of your patient.  Please do not hesitate with any questions or concerns.  I will keep you aware of any further encounters with Ranjith Rice.        Sincerely yours,         CHECO Bergman

## 2021-12-07 NOTE — TELEPHONE ENCOUNTER
Spoke with Mr Rice reminding him of his appointment for Wednesday, December 8th, 2021. [facility] am for 8 o'clock testing and follow up afterwards at 9 am with Tracey KESSLER. Mr Rice confirmed he would be here.

## 2021-12-08 PROBLEM — Z01.818 PREOP TESTING: Status: RESOLVED | Noted: 2020-11-11 | Resolved: 2021-01-01

## 2021-12-08 NOTE — PROGRESS NOTES
"12/8/2021     Rigo Negrete MD  [address] [facility] [address]      Ranjith Rice  1940    Chief Complaint   Patient presents with   • Follow-up     6 Month Follow Up For Bilateral Carotid Artery Stenosis. Test [id number] US pad carotid bilateral. Patient denies any stroke like symptoms.    • Former Smoker     Patient is a Former SMoker - Quit 2015   • Med Management     Verbally verified medications with patient        Dear Rigo Negrete MD       HPI  I had the pleasure of seeing your patient Ranjith Rice in the office today.As you recall, Ranjith Rice is a 81 y.o.  male who we are following for asymptomatic carotid occlusive disease.  Currently he is doing well denies any strokelike symptoms.  He did undergo right carotid endarterectomy on 11/16/2020.  Today during his visit, he reports hoarseness also reports a weird taste in his mouth.  He states this has been since surgery however not previously mentioned.  He is maintained on aspirin, Plavix, and Crestor.      Review of Systems   Constitutional: Negative.    HENT: Positive for voice change (hoarse).    Eyes: Negative.    Respiratory: Negative.    Cardiovascular: Negative.    Gastrointestinal: Negative.    Endocrine: Negative.    Genitourinary: Negative.    Musculoskeletal: Negative.    Skin: Negative.    Allergic/Immunologic: Negative.    Neurological: Negative.    Hematological: Negative.    Psychiatric/Behavioral: Negative.    All other systems reviewed and are negative.       /82 (BP Location: Right arm, Patient Position: Sitting, Cuff Size: Adult)   Ht 177.8 cm (70\")   Wt 70.3 kg (155 lb)   BMI 22.24 kg/m²   Physical Exam  Vitals and nursing note reviewed.   Constitutional:       Appearance: Normal appearance. He is well-developed.   HENT:      Head: Normocephalic and atraumatic.   Eyes:      General: No scleral icterus.     Pupils: Pupils are equal, round, and reactive to light.   Neck:      Thyroid: No thyromegaly.   Cardiovascular: "      Rate and Rhythm: Normal rate and regular rhythm.      Heart sounds: Normal heart sounds.   Pulmonary:      Effort: Pulmonary effort is normal.      Breath sounds: Normal breath sounds.   Abdominal:      General: Bowel sounds are normal.      Palpations: Abdomen is soft.   Musculoskeletal:         General: Normal range of motion.      Cervical back: Normal range of motion and neck supple.   Skin:     General: Skin is warm and dry.   Neurological:      General: No focal deficit present.      Mental Status: He is alert and oriented to person, place, and time.   Psychiatric:         Mood and Affect: Mood normal.         Behavior: Behavior normal.         Thought Content: Thought content normal.         Judgment: Judgment normal.             Diagnostic Data:  US Carotid Bilateral    Result Date: 12/8/2021  Narrative: History: Carotid occlusive disease      Impression: Impression: 1. There is less than 50% stenosis of the right internal carotid artery. 2. There is 50-69% stenosis of the left internal carotid artery. 3. Antegrade flow is demonstrated in bilateral vertebral arteries.  Comments: Bilateral carotid vertebral arterial duplex scan was performed.  Grayscale imaging shows intimal thickening and calcified elements at the carotid bifurcation. The right internal carotid artery peak systolic velocity is 116.7 cm/sec. The end-diastolic velocity is 26.3 cm/sec. The right ICA/CCA ratio is approximately 2.2 . These findings correlate with less than 50% stenosis of the right internal carotid artery.  Grayscale imaging shows intimal thickening and calcified elements at the carotid bifurcation. The left internal carotid artery peak systolic velocity is 261.9 cm/sec. The end-diastolic velocity is 61.2 cm/sec. The left ICA/CCA ratio is approximately 1.4 . These findings correlate with 50-69% stenosis of the left internal carotid artery.  Antegrade flow is demonstrated in bilateral vertebral arteries. There is greater than  50% stenosis in bilateral common carotid arteries and bilateral external carotid arteries. This report was finalized on 12/08/2021 15:39 by Dr. Gabriel Wright MD.         Patient Active Problem List   Diagnosis   • Hypercholesteremia   • Bilateral carotid artery stenosis   • Preop cardiovascular exam   • Premature atrial complexes   • Ex-smoker for more than 1 year   • MANTILLA (dyspnea on exertion)   • Abnormal ECG         ICD-10-CM ICD-9-CM   1. Bilateral carotid artery stenosis  I65.23 433.10     433.30   2. Hypercholesteremia  E78.00 272.0       Plan: After thoroughly evaluating Ranjith Rice, I believe the best course of action is to remain conservative from vascular surgery standpoint.  I did review his testing which shows less than 50% right carotid stenosis and 50 to 69% left carotid stenosis.  There was some ulcerated plaque noted on the left.  I did review with Dr. Wright as well and next time he would like to have a CTA of the neck to evaluate further.  As far as his hoarseness is concerned, he has not mentioned this before however I did offer an appointment to ENT for evaluation but he stated he would discuss with his doctor.  He also feels like bad taste of his food is related to his previous carotid surgery a year ago.  I did discuss vascular risk factors as they pertain to the progression of vascular disease, including controlling his cholesterol which he is maintained on Lipitor for.  The patient can continue taking their current medication regimen as previously planned.  This was all discussed in full with complete understanding.    Thank you for allowing me to participate in the care of your patient.  Please do not hesitate with any questions or concerns.  I will keep you aware of any further encounters with Ranjith Rice.        Sincerely yours,         CHECO Bergman

## 2022-01-01 ENCOUNTER — APPOINTMENT (OUTPATIENT)
Dept: GENERAL RADIOLOGY | Age: 82
DRG: 682 | End: 2022-01-01
Attending: INTERNAL MEDICINE
Payer: MEDICARE

## 2022-01-01 ENCOUNTER — TELEPHONE (OUTPATIENT)
Dept: VASCULAR SURGERY | Facility: CLINIC | Age: 82
End: 2022-01-01

## 2022-01-01 ENCOUNTER — APPOINTMENT (OUTPATIENT)
Dept: CT IMAGING | Age: 82
DRG: 682 | End: 2022-01-01
Attending: INTERNAL MEDICINE
Payer: MEDICARE

## 2022-01-01 ENCOUNTER — HOSPITAL ENCOUNTER (OUTPATIENT)
Dept: GENERAL RADIOLOGY | Facility: HOSPITAL | Age: 82
Discharge: HOME | End: 2022-06-17

## 2022-01-01 ENCOUNTER — OFFICE VISIT (OUTPATIENT)
Dept: VASCULAR SURGERY | Facility: CLINIC | Age: 82
End: 2022-01-01

## 2022-01-01 ENCOUNTER — LAB (OUTPATIENT)
Dept: LAB | Facility: HOSPITAL | Age: 82
End: 2022-01-01

## 2022-01-01 ENCOUNTER — HOSPITAL ENCOUNTER (INPATIENT)
Age: 82
LOS: 1 days | Discharge: HOSPICE | DRG: 682 | End: 2022-06-30
Attending: INTERNAL MEDICINE | Admitting: INTERNAL MEDICINE
Payer: MEDICARE

## 2022-01-01 ENCOUNTER — HOSPITAL ENCOUNTER (OUTPATIENT)
Dept: CT IMAGING | Facility: HOSPITAL | Age: 82
Discharge: HOME | End: 2022-06-02
Admitting: NURSE PRACTITIONER

## 2022-01-01 ENCOUNTER — APPOINTMENT (OUTPATIENT)
Dept: ULTRASOUND IMAGING | Age: 82
DRG: 682 | End: 2022-01-01
Attending: INTERNAL MEDICINE
Payer: MEDICARE

## 2022-01-01 ENCOUNTER — PRE-ADMISSION TESTING (OUTPATIENT)
Dept: PREADMISSION TESTING | Facility: HOSPITAL | Age: 82
End: 2022-01-01

## 2022-01-01 VITALS
SYSTOLIC BLOOD PRESSURE: 142 MMHG | HEIGHT: 68 IN | DIASTOLIC BLOOD PRESSURE: 73 MMHG | WEIGHT: 152.78 LBS | BODY MASS INDEX: 23.15 KG/M2 | OXYGEN SATURATION: 97 % | HEART RATE: 69 BPM | RESPIRATION RATE: 17 BRPM

## 2022-01-01 VITALS
BODY MASS INDEX: 22.05 KG/M2 | SYSTOLIC BLOOD PRESSURE: 148 MMHG | HEART RATE: 71 BPM | HEIGHT: 70 IN | DIASTOLIC BLOOD PRESSURE: 84 MMHG | WEIGHT: 154 LBS | OXYGEN SATURATION: 99 %

## 2022-01-01 VITALS
TEMPERATURE: 96.6 F | DIASTOLIC BLOOD PRESSURE: 56 MMHG | HEART RATE: 86 BPM | WEIGHT: 186.7 LBS | SYSTOLIC BLOOD PRESSURE: 123 MMHG | OXYGEN SATURATION: 100 % | RESPIRATION RATE: 16 BRPM | BODY MASS INDEX: 23.96 KG/M2 | HEIGHT: 74 IN

## 2022-01-01 DIAGNOSIS — I65.23 BILATERAL CAROTID ARTERY STENOSIS: ICD-10-CM

## 2022-01-01 DIAGNOSIS — Z79.02 ENCOUNTER FOR MONITORING ANTIPLATELET THERAPY: ICD-10-CM

## 2022-01-01 DIAGNOSIS — Z01.818 PREOP TESTING: ICD-10-CM

## 2022-01-01 DIAGNOSIS — E78.00 HYPERCHOLESTEREMIA: ICD-10-CM

## 2022-01-01 DIAGNOSIS — Z51.81 ENCOUNTER FOR MONITORING ANTIPLATELET THERAPY: ICD-10-CM

## 2022-01-01 DIAGNOSIS — I65.23 BILATERAL CAROTID ARTERY STENOSIS: Primary | ICD-10-CM

## 2022-01-01 LAB
ABO GROUP BLD: NORMAL
ADENOVIRUS BY PCR: NOT DETECTED
ALBUMIN SERPL-MCNC: 1.9 G/DL (ref 3.5–5.2)
ALBUMIN SERPL-MCNC: 2.2 G/DL (ref 3.5–5.2)
ALP BLD-CCNC: 122 U/L (ref 40–130)
ALP BLD-CCNC: 174 U/L (ref 40–130)
ALT SERPL-CCNC: 35 U/L (ref 5–41)
ALT SERPL-CCNC: 40 U/L (ref 5–41)
ANION GAP SERPL CALCULATED.3IONS-SCNC: 10 MMOL/L (ref 7–19)
ANION GAP SERPL CALCULATED.3IONS-SCNC: 13 MMOL/L (ref 7–19)
ANION GAP SERPL CALCULATED.3IONS-SCNC: 8 MMOL/L (ref 5–15)
ANISOCYTOSIS: ABNORMAL
APTT PPP: 35.2 SECONDS (ref 24.1–35)
APTT: 55.9 SEC (ref 26–36.2)
APTT: 81.3 SEC (ref 26–36.2)
AST SERPL-CCNC: 179 U/L (ref 5–40)
AST SERPL-CCNC: 214 U/L (ref 5–40)
BASE EXCESS ARTERIAL: 1.5 MMOL/L (ref -2–2)
BASOPHILS # BLD AUTO: 0.05 10*3/MM3 (ref 0–0.2)
BASOPHILS ABSOLUTE: 0 K/UL (ref 0–0.2)
BASOPHILS ABSOLUTE: 0 K/UL (ref 0–0.2)
BASOPHILS NFR BLD AUTO: 0.5 % (ref 0–1.5)
BASOPHILS RELATIVE PERCENT: 0 % (ref 0–1)
BASOPHILS RELATIVE PERCENT: 0.1 % (ref 0–1)
BILIRUB SERPL-MCNC: 1 MG/DL (ref 0.2–1.2)
BILIRUB SERPL-MCNC: 1.1 MG/DL (ref 0.2–1.2)
BLD GP AB SCN SERPL QL: NEGATIVE
BORDETELLA PARAPERTUSSIS BY PCR: NOT DETECTED
BORDETELLA PERTUSSIS BY PCR: NOT DETECTED
BUN BLDV-MCNC: 45 MG/DL (ref 8–23)
BUN BLDV-MCNC: 55 MG/DL (ref 8–23)
BUN SERPL-MCNC: 14 MG/DL (ref 8–23)
BUN/CREAT SERPL: 12.7 (ref 7–25)
CALCIUM SERPL-MCNC: 7.2 MG/DL (ref 8.8–10.2)
CALCIUM SERPL-MCNC: 7.2 MG/DL (ref 8.8–10.2)
CALCIUM SPEC-SCNC: 9.1 MG/DL (ref 8.6–10.5)
CARBOXYHEMOGLOBIN ARTERIAL: 1.7 % (ref 0–5)
CHLAMYDOPHILIA PNEUMONIAE BY PCR: NOT DETECTED
CHLORIDE BLD-SCNC: 102 MMOL/L (ref 98–111)
CHLORIDE BLD-SCNC: 103 MMOL/L (ref 98–111)
CHLORIDE SERPL-SCNC: 105 MMOL/L (ref 98–107)
CO2 SERPL-SCNC: 26 MMOL/L (ref 22–29)
CO2: 21 MMOL/L (ref 22–29)
CO2: 25 MMOL/L (ref 22–29)
CORONAVIRUS 229E BY PCR: NOT DETECTED
CORONAVIRUS HKU1 BY PCR: NOT DETECTED
CORONAVIRUS NL63 BY PCR: NOT DETECTED
CORONAVIRUS OC43 BY PCR: NOT DETECTED
CREAT BLDA-MCNC: 1.4 MG/DL (ref 0.6–1.3)
CREAT SERPL-MCNC: 1.1 MG/DL (ref 0.76–1.27)
CREAT SERPL-MCNC: 3.4 MG/DL (ref 0.5–1.2)
CREAT SERPL-MCNC: 4.1 MG/DL (ref 0.5–1.2)
DEPRECATED RDW RBC AUTO: 48 FL (ref 37–54)
EGFRCR SERPLBLD CKD-EPI 2021: 67.9 ML/MIN/1.73
EOSINOPHIL # BLD AUTO: 0.34 10*3/MM3 (ref 0–0.4)
EOSINOPHIL NFR BLD AUTO: 3.7 % (ref 0.3–6.2)
EOSINOPHILS ABSOLUTE: 0.98 K/UL (ref 0–0.6)
EOSINOPHILS ABSOLUTE: 1.2 K/UL (ref 0–0.6)
EOSINOPHILS RELATIVE PERCENT: 4 % (ref 0–5)
EOSINOPHILS RELATIVE PERCENT: 5.8 % (ref 0–5)
ERYTHROCYTE [DISTWIDTH] IN BLOOD BY AUTOMATED COUNT: 13.2 % (ref 12.3–15.4)
FERRITIN: >2000 NG/ML (ref 30–400)
FIO2: 100 %
GFR AFRICAN AMERICAN: 17
GFR AFRICAN AMERICAN: 21
GFR NON-AFRICAN AMERICAN: 14
GFR NON-AFRICAN AMERICAN: 18
GLUCOSE BLD-MCNC: 113 MG/DL (ref 74–109)
GLUCOSE BLD-MCNC: 94 MG/DL (ref 74–109)
GLUCOSE SERPL-MCNC: 105 MG/DL (ref 65–99)
HAV IGM SER IA-ACNC: NORMAL
HBV SURFACE AB TITR SER: REACTIVE {TITER}
HCO3 ARTERIAL: 25.9 MMOL/L (ref 22–26)
HCT VFR BLD AUTO: 43.6 % (ref 37.5–51)
HCT VFR BLD CALC: 23.1 % (ref 42–52)
HCT VFR BLD CALC: 27.5 % (ref 42–52)
HEMOGLOBIN, ART, EXTENDED: 9 G/DL (ref 14–18)
HEMOGLOBIN: 7.8 G/DL (ref 14–18)
HEMOGLOBIN: 9.1 G/DL (ref 14–18)
HEPATITIS B CORE IGM ANTIBODY: NORMAL
HEPATITIS B SURFACE ANTIGEN INTERPRETATION: NORMAL
HEPATITIS C ANTIBODY INTERPRETATION: NORMAL
HGB BLD-MCNC: 14.3 G/DL (ref 13–17.7)
HUMAN METAPNEUMOVIRUS BY PCR: NOT DETECTED
HUMAN RHINOVIRUS/ENTEROVIRUS BY PCR: NOT DETECTED
IMM GRANULOCYTES # BLD AUTO: 0.03 10*3/MM3 (ref 0–0.05)
IMM GRANULOCYTES NFR BLD AUTO: 0.3 % (ref 0–0.5)
IMMATURE GRANULOCYTES #: 0.3 K/UL
IMMATURE GRANULOCYTES #: 0.3 K/UL
INFLUENZA A BY PCR: NOT DETECTED
INFLUENZA B BY PCR: NOT DETECTED
INR BLD: 1.73 (ref 0.88–1.18)
INR BLD: 1.95 (ref 0.88–1.18)
INR PPP: 1.07 (ref 0.91–1.09)
IRON SATURATION: 38 % (ref 14–50)
IRON: 33 UG/DL (ref 59–158)
LV EF: 60 %
LVEF MODALITY: NORMAL
LYMPHOCYTES # BLD AUTO: 1.42 10*3/MM3 (ref 0.7–3.1)
LYMPHOCYTES ABSOLUTE: 1.2 K/UL (ref 1.1–4.5)
LYMPHOCYTES ABSOLUTE: 1.4 K/UL (ref 1.1–4.5)
LYMPHOCYTES NFR BLD AUTO: 15.3 % (ref 19.6–45.3)
LYMPHOCYTES RELATIVE PERCENT: 5 % (ref 20–40)
LYMPHOCYTES RELATIVE PERCENT: 6.9 % (ref 20–40)
MACROCYTES: ABNORMAL
MCH RBC QN AUTO: 31.5 PG (ref 27–31)
MCH RBC QN AUTO: 32 PG (ref 27–31)
MCH RBC QN AUTO: 32.8 PG (ref 26.6–33)
MCHC RBC AUTO-ENTMCNC: 32.8 G/DL (ref 31.5–35.7)
MCHC RBC AUTO-ENTMCNC: 33.1 G/DL (ref 33–37)
MCHC RBC AUTO-ENTMCNC: 33.8 G/DL (ref 33–37)
MCV RBC AUTO: 100 FL (ref 79–97)
MCV RBC AUTO: 94.7 FL (ref 80–94)
MCV RBC AUTO: 95.2 FL (ref 80–94)
MECHANICAL RATE IN BPM: 12
METHEMOGLOBIN ARTERIAL: 1.4 %
MODE: ABNORMAL
MONOCYTES # BLD AUTO: 0.72 10*3/MM3 (ref 0.1–0.9)
MONOCYTES ABSOLUTE: 1.2 K/UL (ref 0–0.9)
MONOCYTES ABSOLUTE: 1.7 K/UL (ref 0–0.9)
MONOCYTES NFR BLD AUTO: 7.8 % (ref 5–12)
MONOCYTES RELATIVE PERCENT: 5 % (ref 0–10)
MONOCYTES RELATIVE PERCENT: 8.3 % (ref 0–10)
MYCOPLASMA PNEUMONIAE BY PCR: NOT DETECTED
NEUTROPHILS ABSOLUTE: 16 K/UL (ref 1.5–7.5)
NEUTROPHILS ABSOLUTE: 21.2 K/UL (ref 1.5–7.5)
NEUTROPHILS NFR BLD AUTO: 6.72 10*3/MM3 (ref 1.7–7)
NEUTROPHILS NFR BLD AUTO: 72.4 % (ref 42.7–76)
NEUTROPHILS RELATIVE PERCENT: 77.5 % (ref 50–65)
NEUTROPHILS RELATIVE PERCENT: 86 % (ref 50–65)
NRBC BLD AUTO-RTO: 0 /100 WBC (ref 0–0.2)
O2 CONTENT ARTERIAL: 13.5 ML/DL
O2 SAT, ARTERIAL: 96.9 %
O2 THERAPY: ABNORMAL
PARAINFLUENZA VIRUS 1 BY PCR: NOT DETECTED
PARAINFLUENZA VIRUS 2 BY PCR: NOT DETECTED
PARAINFLUENZA VIRUS 3 BY PCR: NOT DETECTED
PARAINFLUENZA VIRUS 4 BY PCR: NOT DETECTED
PARATHYROID HORMONE INTACT: 322.4 PG/ML (ref 15–65)
PCO2 ARTERIAL: 39 MMHG (ref 35–45)
PDW BLD-RTO: 15.5 % (ref 11.5–14.5)
PDW BLD-RTO: 15.7 % (ref 11.5–14.5)
PH ARTERIAL: 7.43 (ref 7.35–7.45)
PLATELET # BLD AUTO: 282 10*3/MM3 (ref 140–450)
PLATELET # BLD: 194 K/UL (ref 130–400)
PLATELET # BLD: 216 K/UL (ref 130–400)
PLATELET SLIDE REVIEW: ADEQUATE
PMV BLD AUTO: 10.2 FL (ref 9.4–12.4)
PMV BLD AUTO: 9.3 FL (ref 6–12)
PMV BLD AUTO: 9.9 FL (ref 9.4–12.4)
PO2 ARTERIAL: 446 MMHG (ref 80–100)
POTASSIUM REFLEX MAGNESIUM: 4 MMOL/L (ref 3.5–5)
POTASSIUM SERPL-SCNC: 3.9 MMOL/L (ref 3.5–5)
POTASSIUM SERPL-SCNC: 4.4 MMOL/L (ref 3.5–5.2)
POTASSIUM, WHOLE BLOOD: 3.5
PROTHROMBIN TIME: 13.5 SECONDS (ref 11.9–14.6)
PROTHROMBIN TIME: 20.5 SEC (ref 12–14.6)
PROTHROMBIN TIME: 22.6 SEC (ref 12–14.6)
QT INTERVAL: 440 MS
QTC INTERVAL: 467 MS
RBC # BLD AUTO: 4.36 10*6/MM3 (ref 4.14–5.8)
RBC # BLD: 2.44 M/UL (ref 4.7–6.1)
RBC # BLD: 2.89 M/UL (ref 4.7–6.1)
REASON FOR REJECTION: NORMAL
REJECTED TEST: NORMAL
RESPIRATORY SYNCYTIAL VIRUS BY PCR: NOT DETECTED
RH BLD: POSITIVE
SARS-COV-2 ORF1AB RESP QL NAA+PROBE: NOT DETECTED
SARS-COV-2, PCR: NOT DETECTED
SODIUM BLD-SCNC: 136 MMOL/L (ref 136–145)
SODIUM BLD-SCNC: 138 MMOL/L (ref 136–145)
SODIUM SERPL-SCNC: 139 MMOL/L (ref 136–145)
T&S EXPIRATION DATE: NORMAL
TOTAL IRON BINDING CAPACITY: 87 UG/DL (ref 250–400)
TOTAL PROTEIN: 3.9 G/DL (ref 6.6–8.7)
TOTAL PROTEIN: 4.2 G/DL (ref 6.6–8.7)
TROPONIN: 0.04 NG/ML (ref 0–0.03)
VT MECHANICAL: 500 %
WBC # BLD: 20.6 K/UL (ref 4.8–10.8)
WBC # BLD: 24.6 K/UL (ref 4.8–10.8)
WBC NRBC COR # BLD: 9.28 10*3/MM3 (ref 3.4–10.8)

## 2022-01-01 PROCEDURE — 86901 BLOOD TYPING SEROLOGIC RH(D): CPT | Performed by: SURGERY

## 2022-01-01 PROCEDURE — 2580000003 HC RX 258: Performed by: INTERNAL MEDICINE

## 2022-01-01 PROCEDURE — 36592 COLLECT BLOOD FROM PICC: CPT

## 2022-01-01 PROCEDURE — 85610 PROTHROMBIN TIME: CPT

## 2022-01-01 PROCEDURE — 80074 ACUTE HEPATITIS PANEL: CPT

## 2022-01-01 PROCEDURE — U0005 INFEC AGEN DETEC AMPLI PROBE: HCPCS

## 2022-01-01 PROCEDURE — 6360000002 HC RX W HCPCS: Performed by: INTERNAL MEDICINE

## 2022-01-01 PROCEDURE — 83540 ASSAY OF IRON: CPT

## 2022-01-01 PROCEDURE — 85025 COMPLETE CBC W/AUTO DIFF WBC: CPT

## 2022-01-01 PROCEDURE — 2500000003 HC RX 250 WO HCPCS: Performed by: INTERNAL MEDICINE

## 2022-01-01 PROCEDURE — 86900 BLOOD TYPING SEROLOGIC ABO: CPT | Performed by: SURGERY

## 2022-01-01 PROCEDURE — 80048 BASIC METABOLIC PNL TOTAL CA: CPT

## 2022-01-01 PROCEDURE — 86850 RBC ANTIBODY SCREEN: CPT | Performed by: SURGERY

## 2022-01-01 PROCEDURE — 1210000000 HC MED SURG R&B

## 2022-01-01 PROCEDURE — 74176 CT ABD & PELVIS W/O CONTRAST: CPT

## 2022-01-01 PROCEDURE — U0004 COV-19 TEST NON-CDC HGH THRU: HCPCS

## 2022-01-01 PROCEDURE — 36415 COLL VENOUS BLD VENIPUNCTURE: CPT

## 2022-01-01 PROCEDURE — 85730 THROMBOPLASTIN TIME PARTIAL: CPT

## 2022-01-01 PROCEDURE — 37799 UNLISTED PX VASCULAR SURGERY: CPT

## 2022-01-01 PROCEDURE — 99223 1ST HOSP IP/OBS HIGH 75: CPT | Performed by: PHYSICIAN ASSISTANT

## 2022-01-01 PROCEDURE — 99221 1ST HOSP IP/OBS SF/LOW 40: CPT | Performed by: INTERNAL MEDICINE

## 2022-01-01 PROCEDURE — 93005 ELECTROCARDIOGRAM TRACING: CPT

## 2022-01-01 PROCEDURE — 90945 DIALYSIS ONE EVALUATION: CPT

## 2022-01-01 PROCEDURE — 99214 OFFICE O/P EST MOD 30 MIN: CPT | Performed by: SURGERY

## 2022-01-01 PROCEDURE — 99233 SBSQ HOSP IP/OBS HIGH 50: CPT | Performed by: PHYSICIAN ASSISTANT

## 2022-01-01 PROCEDURE — 82803 BLOOD GASES ANY COMBINATION: CPT

## 2022-01-01 PROCEDURE — 5A1D70Z PERFORMANCE OF URINARY FILTRATION, INTERMITTENT, LESS THAN 6 HOURS PER DAY: ICD-10-PCS | Performed by: INTERNAL MEDICINE

## 2022-01-01 PROCEDURE — C9803 HOPD COVID-19 SPEC COLLECT: HCPCS

## 2022-01-01 PROCEDURE — 0BH17EZ INSERTION OF ENDOTRACHEAL AIRWAY INTO TRACHEA, VIA NATURAL OR ARTIFICIAL OPENING: ICD-10-PCS | Performed by: INTERNAL MEDICINE

## 2022-01-01 PROCEDURE — 5A1935Z RESPIRATORY VENTILATION, LESS THAN 24 CONSECUTIVE HOURS: ICD-10-PCS | Performed by: INTERNAL MEDICINE

## 2022-01-01 PROCEDURE — 94002 VENT MGMT INPAT INIT DAY: CPT

## 2022-01-01 PROCEDURE — 6370000000 HC RX 637 (ALT 250 FOR IP): Performed by: INTERNAL MEDICINE

## 2022-01-01 PROCEDURE — 80053 COMPREHEN METABOLIC PANEL: CPT

## 2022-01-01 PROCEDURE — 71046 X-RAY EXAM CHEST 2 VIEWS: CPT | Performed by: RADIOLOGY

## 2022-01-01 PROCEDURE — 82043 UR ALBUMIN QUANTITATIVE: CPT

## 2022-01-01 PROCEDURE — 86706 HEP B SURFACE ANTIBODY: CPT

## 2022-01-01 PROCEDURE — C9113 INJ PANTOPRAZOLE SODIUM, VIA: HCPCS | Performed by: INTERNAL MEDICINE

## 2022-01-01 PROCEDURE — 83970 ASSAY OF PARATHORMONE: CPT

## 2022-01-01 PROCEDURE — 71045 X-RAY EXAM CHEST 1 VIEW: CPT

## 2022-01-01 PROCEDURE — 74176 CT ABD & PELVIS W/O CONTRAST: CPT | Performed by: RADIOLOGY

## 2022-01-01 PROCEDURE — 99497 ADVNCD CARE PLAN 30 MIN: CPT | Performed by: PHYSICIAN ASSISTANT

## 2022-01-01 PROCEDURE — 0202U NFCT DS 22 TRGT SARS-COV-2: CPT

## 2022-01-01 PROCEDURE — 82570 ASSAY OF URINE CREATININE: CPT

## 2022-01-01 PROCEDURE — 2700000000 HC OXYGEN THERAPY PER DAY

## 2022-01-01 PROCEDURE — 83550 IRON BINDING TEST: CPT

## 2022-01-01 PROCEDURE — 82728 ASSAY OF FERRITIN: CPT

## 2022-01-01 PROCEDURE — 82565 ASSAY OF CREATININE: CPT

## 2022-01-01 PROCEDURE — 93010 ELECTROCARDIOGRAM REPORT: CPT | Performed by: INTERNAL MEDICINE

## 2022-01-01 PROCEDURE — 70450 CT HEAD/BRAIN W/O DYE: CPT | Performed by: RADIOLOGY

## 2022-01-01 PROCEDURE — 8090000000 HC CONTINUOUS VENOVENOUS HEMOFIL

## 2022-01-01 PROCEDURE — 70498 CT ANGIOGRAPHY NECK: CPT

## 2022-01-01 PROCEDURE — C8929 TTE W OR WO FOL WCON,DOPPLER: HCPCS

## 2022-01-01 PROCEDURE — 0 IOPAMIDOL PER 1 ML: Performed by: NURSE PRACTITIONER

## 2022-01-01 PROCEDURE — 6360000004 HC RX CONTRAST MEDICATION: Performed by: INTERNAL MEDICINE

## 2022-01-01 PROCEDURE — 84484 ASSAY OF TROPONIN QUANT: CPT

## 2022-01-01 PROCEDURE — 70450 CT HEAD/BRAIN W/O DYE: CPT

## 2022-01-01 PROCEDURE — 71046 X-RAY EXAM CHEST 2 VIEWS: CPT

## 2022-01-01 RX ORDER — CLOPIDOGREL BISULFATE 75 MG/1
75 TABLET ORAL DAILY
Qty: 30 TABLET | Refills: 5 | Status: SHIPPED | OUTPATIENT
Start: 2022-01-01

## 2022-01-01 RX ORDER — PROPOFOL 10 MG/ML
5-80 INJECTION, EMULSION INTRAVENOUS CONTINUOUS
Status: DISCONTINUED | OUTPATIENT
Start: 2022-01-01 | End: 2022-01-01 | Stop reason: HOSPADM

## 2022-01-01 RX ORDER — NOREPINEPHRINE BIT/0.9 % NACL 16MG/250ML
1-100 INFUSION BOTTLE (ML) INTRAVENOUS CONTINUOUS
Status: DISCONTINUED | OUTPATIENT
Start: 2022-01-01 | End: 2022-01-01 | Stop reason: HOSPADM

## 2022-01-01 RX ORDER — ALBUMIN (HUMAN) 12.5 G/50ML
12.5 SOLUTION INTRAVENOUS PRN
Status: DISCONTINUED | OUTPATIENT
Start: 2022-01-01 | End: 2022-01-01 | Stop reason: HOSPADM

## 2022-01-01 RX ORDER — ONDANSETRON 2 MG/ML
4 INJECTION INTRAMUSCULAR; INTRAVENOUS EVERY 6 HOURS PRN
Status: DISCONTINUED | OUTPATIENT
Start: 2022-01-01 | End: 2022-01-01 | Stop reason: HOSPADM

## 2022-01-01 RX ORDER — ACETAMINOPHEN 500 MG
500 TABLET ORAL EVERY 4 HOURS PRN
Status: DISCONTINUED | OUTPATIENT
Start: 2022-01-01 | End: 2022-01-01 | Stop reason: HOSPADM

## 2022-01-01 RX ORDER — ACETAMINOPHEN 325 MG/1
650 TABLET ORAL EVERY 6 HOURS PRN
Status: DISCONTINUED | OUTPATIENT
Start: 2022-01-01 | End: 2022-01-01 | Stop reason: HOSPADM

## 2022-01-01 RX ORDER — FENTANYL CITRATE-0.9 % NACL/PF 10 MCG/ML
25-200 PLASTIC BAG, INJECTION (ML) INTRAVENOUS CONTINUOUS
Status: DISCONTINUED | OUTPATIENT
Start: 2022-01-01 | End: 2022-01-01 | Stop reason: HOSPADM

## 2022-01-01 RX ORDER — SODIUM CHLORIDE 0.9 % (FLUSH) 0.9 %
10 SYRINGE (ML) INJECTION EVERY 12 HOURS SCHEDULED
Status: DISCONTINUED | OUTPATIENT
Start: 2022-01-01 | End: 2022-01-01 | Stop reason: HOSPADM

## 2022-01-01 RX ORDER — CHLORHEXIDINE GLUCONATE 0.12 MG/ML
15 RINSE ORAL 2 TIMES DAILY
Status: DISCONTINUED | OUTPATIENT
Start: 2022-01-01 | End: 2022-01-01 | Stop reason: HOSPADM

## 2022-01-01 RX ORDER — MEROPENEM AND SODIUM CHLORIDE 1 G/50ML
1000 INJECTION, SOLUTION INTRAVENOUS EVERY 24 HOURS
Status: DISCONTINUED | OUTPATIENT
Start: 2022-01-01 | End: 2022-01-01 | Stop reason: DRUGHIGH

## 2022-01-01 RX ORDER — ACETAMINOPHEN 650 MG/1
650 SUPPOSITORY RECTAL EVERY 6 HOURS PRN
Status: DISCONTINUED | OUTPATIENT
Start: 2022-01-01 | End: 2022-01-01 | Stop reason: HOSPADM

## 2022-01-01 RX ORDER — DOPAMINE HYDROCHLORIDE 160 MG/100ML
1-20 INJECTION, SOLUTION INTRAVENOUS CONTINUOUS
Status: DISCONTINUED | OUTPATIENT
Start: 2022-01-01 | End: 2022-01-01 | Stop reason: HOSPADM

## 2022-01-01 RX ORDER — ATROPINE SULFATE 1 MG/ML
1 INJECTION, SOLUTION INTRAMUSCULAR; INTRAVENOUS; SUBCUTANEOUS ONCE
Status: COMPLETED | OUTPATIENT
Start: 2022-01-01 | End: 2022-01-01

## 2022-01-01 RX ORDER — SODIUM CHLORIDE 0.9 % (FLUSH) 0.9 %
10 SYRINGE (ML) INJECTION PRN
Status: DISCONTINUED | OUTPATIENT
Start: 2022-01-01 | End: 2022-01-01 | Stop reason: HOSPADM

## 2022-01-01 RX ORDER — MEROPENEM AND SODIUM CHLORIDE 1 G/50ML
1000 INJECTION, SOLUTION INTRAVENOUS ONCE
Status: COMPLETED | OUTPATIENT
Start: 2022-01-01 | End: 2022-01-01

## 2022-01-01 RX ORDER — DIPHENHYDRAMINE HYDROCHLORIDE 50 MG/ML
25 INJECTION INTRAMUSCULAR; INTRAVENOUS EVERY 4 HOURS PRN
Status: DISCONTINUED | OUTPATIENT
Start: 2022-01-01 | End: 2022-01-01 | Stop reason: HOSPADM

## 2022-01-01 RX ORDER — ONDANSETRON 4 MG/1
4 TABLET, ORALLY DISINTEGRATING ORAL EVERY 8 HOURS PRN
Status: DISCONTINUED | OUTPATIENT
Start: 2022-01-01 | End: 2022-01-01 | Stop reason: HOSPADM

## 2022-01-01 RX ORDER — SODIUM CHLORIDE 9 MG/ML
INJECTION, SOLUTION INTRAVENOUS PRN
Status: DISCONTINUED | OUTPATIENT
Start: 2022-01-01 | End: 2022-01-01 | Stop reason: HOSPADM

## 2022-01-01 RX ORDER — MEROPENEM AND SODIUM CHLORIDE 1 G/50ML
1000 INJECTION, SOLUTION INTRAVENOUS EVERY 12 HOURS
Status: DISCONTINUED | OUTPATIENT
Start: 2022-01-01 | End: 2022-01-01 | Stop reason: HOSPADM

## 2022-01-01 RX ORDER — PROPOFOL 10 MG/ML
5-50 INJECTION, EMULSION INTRAVENOUS CONTINUOUS
Status: DISCONTINUED | OUTPATIENT
Start: 2022-01-01 | End: 2022-01-01 | Stop reason: SDUPTHER

## 2022-01-01 RX ORDER — HEPARIN SODIUM 1000 [USP'U]/ML
INJECTION, SOLUTION INTRAVENOUS; SUBCUTANEOUS
Status: DISCONTINUED
Start: 2022-01-01 | End: 2022-01-01 | Stop reason: HOSPADM

## 2022-01-01 RX ADMIN — SODIUM CHLORIDE, PRESERVATIVE FREE 20 MG: 5 INJECTION INTRAVENOUS at 07:39

## 2022-01-01 RX ADMIN — EPOETIN ALFA-EPBX 10000 UNITS: 10000 INJECTION, SOLUTION INTRAVENOUS; SUBCUTANEOUS at 18:47

## 2022-01-01 RX ADMIN — DOPAMINE HYDROCHLORIDE 5 MCG/KG/MIN: 160 INJECTION, SOLUTION INTRAVENOUS at 15:02

## 2022-01-01 RX ADMIN — ATROPINE SULFATE 1 MG: 1 INJECTION, SOLUTION INTRAMUSCULAR; INTRAVENOUS; SUBCUTANEOUS at 14:57

## 2022-01-01 RX ADMIN — PHENYLEPHRINE HYDROCHLORIDE 25 MCG/MIN: 10 INJECTION INTRAVENOUS at 11:06

## 2022-01-01 RX ADMIN — PERFLUTREN 1.65 MG: 6.52 INJECTION, SUSPENSION INTRAVENOUS at 16:13

## 2022-01-01 RX ADMIN — SODIUM CHLORIDE, PRESERVATIVE FREE 20 MG: 5 INJECTION INTRAVENOUS at 15:16

## 2022-01-01 RX ADMIN — Medication 100 MCG/HR: at 23:23

## 2022-01-01 RX ADMIN — Medication 20 MCG/MIN: at 15:02

## 2022-01-01 RX ADMIN — IOPAMIDOL 100 ML: 755 INJECTION, SOLUTION INTRAVENOUS at 08:59

## 2022-01-01 RX ADMIN — SODIUM CHLORIDE, PRESERVATIVE FREE 20 MG: 5 INJECTION INTRAVENOUS at 21:15

## 2022-01-01 RX ADMIN — SODIUM CHLORIDE, PRESERVATIVE FREE 10 ML: 5 INJECTION INTRAVENOUS at 07:40

## 2022-01-01 RX ADMIN — DOPAMINE HYDROCHLORIDE 7.5 MCG/KG/MIN: 160 INJECTION, SOLUTION INTRAVENOUS at 11:53

## 2022-01-01 RX ADMIN — Medication 15 MCG/MIN: at 08:27

## 2022-01-01 RX ADMIN — CHLORHEXIDINE GLUCONATE 15 ML: 1.2 RINSE ORAL at 07:39

## 2022-01-01 RX ADMIN — MEROPENEM AND SODIUM CHLORIDE 1000 MG: 1 INJECTION, SOLUTION INTRAVENOUS at 13:28

## 2022-01-01 RX ADMIN — DOPAMINE HYDROCHLORIDE 7.5 MCG/KG/MIN: 160 INJECTION, SOLUTION INTRAVENOUS at 02:29

## 2022-01-01 RX ADMIN — CHLORHEXIDINE GLUCONATE 15 ML: 1.2 RINSE ORAL at 18:44

## 2022-01-01 RX ADMIN — Medication 90 MCG/MIN: at 13:16

## 2022-01-01 RX ADMIN — SODIUM CHLORIDE, PRESERVATIVE FREE 40 MG: 5 INJECTION INTRAVENOUS at 07:39

## 2022-01-01 RX ADMIN — CHLORHEXIDINE GLUCONATE 15 ML: 1.2 RINSE ORAL at 21:16

## 2022-01-01 RX ADMIN — SODIUM CHLORIDE, PRESERVATIVE FREE 40 MG: 5 INJECTION INTRAVENOUS at 15:17

## 2022-01-01 RX ADMIN — PROPOFOL 10 MCG/KG/MIN: 10 INJECTION, EMULSION INTRAVENOUS at 14:54

## 2022-01-01 ASSESSMENT — PULMONARY FUNCTION TESTS
PIF_VALUE: 19
PIF_VALUE: 22
PIF_VALUE: 16
PIF_VALUE: 21
PIF_VALUE: 17
PIF_VALUE: 24
PIF_VALUE: 18
PIF_VALUE: 20
PIF_VALUE: 26
PIF_VALUE: 29
PIF_VALUE: 16
PIF_VALUE: 20
PIF_VALUE: 21
PIF_VALUE: 19
PIF_VALUE: 21
PIF_VALUE: 18
PIF_VALUE: 17
PIF_VALUE: 21
PIF_VALUE: 19
PIF_VALUE: 20
PIF_VALUE: 20
PIF_VALUE: 19
PIF_VALUE: 20
PIF_VALUE: 20
PIF_VALUE: 21
PIF_VALUE: 0
PIF_VALUE: 17
PIF_VALUE: 59
PIF_VALUE: 15
PIF_VALUE: 21
PIF_VALUE: 19
PIF_VALUE: 14
PIF_VALUE: 20
PIF_VALUE: 23
PIF_VALUE: 19
PIF_VALUE: 20
PIF_VALUE: 19
PIF_VALUE: 20
PIF_VALUE: 20
PIF_VALUE: 24

## 2022-06-06 NOTE — TELEPHONE ENCOUNTER
SPOKE WITH PATIENT REGARDING SURGERY. PT WAS INFORMED OF PRE WORK ON 06/17 AT 0945. PT WAS ALSO INFORMED OF SURGERY ON 06/20 AT 0600. PT WAS INFORMED OF COVID TEST AND VISITATION. PT STATED UNDERSTANDING OF INSTRUCTIONS AND ALL INFORMATION.

## 2022-06-20 NOTE — TELEPHONE ENCOUNTER
Left message for patient contact to call. Patient was scheduled to arrive @ 5:00 AM for surgery for Dr. Wright.

## 2022-06-29 PROBLEM — K35.80 ACUTE APPENDICITIS: Status: ACTIVE | Noted: 2022-01-01

## 2022-06-29 PROBLEM — A41.9 SEPTIC SHOCK (HCC): Status: ACTIVE | Noted: 2022-01-01

## 2022-06-29 PROBLEM — R94.31 ABNORMAL ECG: Status: ACTIVE | Noted: 2020-10-19

## 2022-06-29 PROBLEM — N17.9 AKI (ACUTE KIDNEY INJURY) (HCC): Status: ACTIVE | Noted: 2022-01-01

## 2022-06-29 PROBLEM — I49.1 PREMATURE ATRIAL COMPLEXES: Status: ACTIVE | Noted: 2020-10-19

## 2022-06-29 PROBLEM — R65.21 SEPTIC SHOCK (HCC): Status: ACTIVE | Noted: 2022-01-01

## 2022-06-29 PROBLEM — E78.00 HYPERCHOLESTEREMIA: Status: ACTIVE | Noted: 2022-01-01

## 2022-06-29 PROBLEM — I65.23 BILATERAL CAROTID ARTERY STENOSIS: Status: ACTIVE | Noted: 2020-10-15

## 2022-06-29 PROBLEM — R06.09 DOE (DYSPNEA ON EXERTION): Status: ACTIVE | Noted: 2020-10-19

## 2022-06-29 NOTE — CONSULTS
Nephrology ([facility]) Consult Note      Patient:  Denzel Mccracken  YOB: 1940  Date of Service: 6/29/2022  MRN: [id number]   Acct: [de-identified]   Primary Care Physician: Reynold Dueñas  Advance Directive: DNR  Admit Date: 6/29/2022       Hospital Day: 0  Referring Provider: Myesha Richey DO    Patient independently seen and examined, Chart, Consults, Notes, Operative notes, Labs, Cardiology, and Radiology studies reviewed as available. Chief complaint: Abnormal labs. Subjective:  Denzel Mccracken is a [de-identified] y.o. male for whom we were consulted for evaluation and treatment of acute kidney injury/dialysis dependent. He presented to [facility] with acute abdomen/ruptured appendix. He underwent laparotomy postoperatively suffered sepsis, acute kidney injury, respiratory failure. He was intubated and hemodialysis treatment was initiated via left femoral dialysis catheter. He also had an episode of atrial fibrillation started on intravenous amiodarone. IV Levophed was also initiated for the treatment of sepsis/septic shock. He is now transferred to [facility] for continuation of care that include vent management, dialysis and cardiac care. CT scan of the abdomen shows possible cirrhosis of liver as well. Nurses has noticed that he is bleeding from multiple site of his lines. He has possible early DIC. Nephrology is consulted for continuation of dialysis. Allergies:  Patient has no allergy information on record.     Medicines:  Current Facility-Administered Medications   Medication Dose Route Frequency Provider Last Rate Last Admin    norepinephrine (LEVOPHED) 16 mg in sodium chloride 0.9 % 250 mL infusion  1-100 mcg/min IntraVENous Continuous Troy Negus, DO 18.8 mL/hr at 06/29/22 1502 20 mcg/min at 06/29/22 1502    fentaNYL (SUBLIMAZE) 2500 mcg in sodium chloride 0.9% 250 mL premix   mcg/hr IntraVENous Continuous Troy Negus, DO 5 mL/hr at 06/29/22 1504 50 mcg/hr at 06/29/22 1504    sodium chloride flush 0.9 % injection 10 mL  10 mL IntraVENous 2 times per day Willard Pastures, DO        sodium chloride flush 0.9 % injection 10 mL  10 mL IntraVENous PRN Willard Pastures, DO        0.9 % sodium chloride infusion   IntraVENous PRN Willard Pastures, DO        ondansetron (ZOFRAN-ODT) disintegrating tablet 4 mg  4 mg Oral Q8H PRN Willard Pastures, DO        Or    ondansetron [facility]) injection 4 mg  4 mg IntraVENous Q6H PRN Willard Pastures, DO        acetaminophen (TYLENOL) tablet 650 mg  650 mg Oral Q6H PRN Willard Pastures, DO        Or    acetaminophen (TYLENOL) suppository 650 mg  650 mg Rectal Q6H PRN Willard Pastures, DO        pantoprazole (PROTONIX) 40 mg in sodium chloride (PF) 10 mL injection  40 mg IntraVENous Daily Willard Pastures, DO   40 mg at 06/29/22 1517    chlorhexidine (PERIDEX) 0.12 % solution 15 mL  15 mL Mouth/Throat BID Willard Pastures, DO        famotidine (PEPCID) 20 mg in sodium chloride (PF) 10 mL injection  20 mg IntraVENous BID Willard Pastures, DO   20 mg at 06/29/22 1516    propofol injection  5-80 mcg/kg/min IntraVENous Continuous Willard Pastures, DO 5.3 mL/hr at 06/29/22 1454 10 mcg/kg/min at 06/29/22 1454    DOPamine (INTROPIN) 400 mg in dextrose 5 % 250 mL infusion  1-20 mcg/kg/min IntraVENous Continuous Dick Hall MD 16.7 mL/hr at 06/29/22 1502 5 mcg/kg/min at 06/29/22 1502    perflutren lipid microspheres (DEFINITY) injection 1.65 mg  1.5 mL IntraVENous ONCE PRN Willard Pastures, DO   1.65 mg at 06/29/22 1613       Past Medical History:  Past Medical History:   Diagnosis Date    Bilateral carotid artery stenosis 10/15/2020    Hypercholesteremia 6/27/2022       Past Surgical History:  Past Surgical History:   Procedure Laterality Date    APPENDECTOMY      KNEE SURGERY         Family History  No family history on file.     Social History  Social History Height Weight   06/29/22 1500 (!) 104/40 78 19 100 % -- --   06/29/22 1443 -- 67 17 -- -- --   06/29/22 1430 -- 60 12 100 % -- --   06/29/22 1400 (!) 111/36 (!) 38 (!) 8 100 % -- --   06/29/22 1346 -- -- -- -- 6' 2\" (1.88 m) 196 lb 4.8 oz (89 kg)   06/29/22 1345 -- (!) 38 12 100 % -- --   06/29/22 1337 -- (!) 47 (!) 9 100 % -- --   06/29/22 1332 (!) 119/36 80 17 100 % -- --     No intake or output data in the 24 hours ending 06/29/22 1624  General: Intubated/sedated. And HEENT: Normocephalic atraumatic head/orotracheal intubation  Neck: Supple with no JVD  Chest:  Bilateral decreased breath sounds. CVS: Irregularly irregular S1 and S2. Abdominal: Soft and nondistended and hypoactive bowel sounds. Extremities: 2+ pedal edema  Skin: warm and dry without rash      Labs:  BMP:   Recent Labs     06/29/22  1429 06/29/22  1441     --    K 3.9 3.5     --    CO2 25  --    BUN 45*  --    CREATININE 3.4*  --    CALCIUM 7.2*  --      CBC:   Recent Labs     06/29/22 1429   WBC 20.6*   HGB 7.8*   HCT 23.1*   MCV 94.7*        LIVER PROFILE:   Recent Labs     06/29/22  1429   *   ALT 35   BILITOT 1.0   ALKPHOS 122     PT/INR:   Recent Labs     06/29/22  1429   PROTIME 22.6*   INR 1.95*     APTT:   Recent Labs     06/29/22 1429   APTT 81.3*     BNP:  No results for input(s): BNP in the last 72 hours. Ionized Calcium:No results for input(s): IONCA in the last 72 hours. Magnesium:No results for input(s): MG in the last 72 hours. Phosphorus:No results for input(s): PHOS in the last 72 hours. HgbA1C: No results for input(s): LABA1C in the last 72 hours. Hepatic:   Recent Labs     06/29/22  1429   ALKPHOS 122   ALT 35   *   PROT 4.2*   BILITOT 1.0   LABALBU 1.9*     Lactic Acid: No results for input(s): LACTA in the last 72 hours. Troponin: No results for input(s): CKTOTAL, CKMB, TROPONINT in the last 72 hours.   ABGs: No results for input(s): PH, PCO2, PO2, HCO3, O2SAT in the last 72 hours.  CRP:  No results for input(s): CRP in the last 72 hours. Sed Rate:  No results for input(s): SEDRATE in the last 72 hours. Cultures:   No results for input(s): CULTURE in the last 72 hours. No results for input(s): BC, Marinus Risk in the last 72 hours. No results for input(s): CXSURG in the last 72 hours. Radiology reports as per the Radiologist  Radiology: XR CHEST PORTABLE    Result Date: 6/29/2022  NO PRIOR REPORT AVAILABLE Exam: X-RAY OF [facility] Clinical data:ETT/OGT placement. Technique:Two portable views of the chest. Prior studies: No prior studies submitted. Findings: An ETT is in place with the tip 3.7 cm above the flo. An orogastric tube is in place with the tip in the stomach. A right internal jugular central line catheter is in place with the tip within the SVC. There is pulmonary vascular congestion. Left lower lobe atelectasis and or pneumonia with small left pleural effusion. Cardiac silhouette is within normal limits. No acute osseous abnormality is detected. An ETT is in place with the tip 3.7 cm above the flo. An orogastric tube is in place with the tip in the stomach. A right internal jugular central line catheter is in place with the tip within the SVC. There is pulmonary vascular congestion. Left lower lobe atelectasis and or pneumonia with small left pleural effusion. Recommendation: Follow up as clinically indicated. Electronically Signed by Cata Rodriguez MD at 29-Jun-2022 03:53:30 PM                Assessment   1. Acute kidney injury/dialysis dependent. 2.  Acute tubular necrosis. 3.  Sepsis/septic shock. 4.  Paroxysmal atrial fibrillation. 5.  Clinically volume overload. 6.  Cirrhosis of liver. 7.  Acute respiratory failure/intubated. 8.  Patient is currently DNR. 9.  Profound anemia    Plan:  1. Start hemodialysis in a.m.  2.  Try to wean off vasopressor. 3.  Baseline iron studies. 4.  May need blood transfusion. 5.  Start OLIVIER.   6.  Overall prognosis looks poor. Thank you for the consult, we appreciate the opportunity to provide care to your patients. Feel free to contact me if I can be of any further assistance.       Kelley Licona MD  06/29/22  4:24 PM

## 2022-06-29 NOTE — H&P
Hospital Medicine H&P    Patient:  Kellen Graham  MRN: [id number]    Consulting Physician: Sabina Macias DO  Reason for Consult: Medical Management  Primacy Care Physician: Christa Bey    HISTORY OF PRESENT ILLNESS:   The patient is a [de-identified] y.o. male is accepted in transfer from [place], [place]. He presented to their facility on 6/19 with acute perforated appendicitis. He underwent laparoscopic appendectomy. Initially postprocedure he did well. He did have some atrial flutter. He was dosed with higher regimen of oral amiodarone from what I can gather from the outlying records. Unfortunately, he developed acute kidney injury and had persistent renal failure throughout the remainder of his hospitalization. Consultation was obtained with nephrology. He was started on hemodialysis I believe on 6/27. He decompensated from a pulmonary standpoint and required intubation. He also developed DIC and was evaluated by hematology. He had been on Eliquis and was given 2 doses of Kcentra for reversal.  Staff wished to transfer him to a facility for higher level of care. I agreed to take him in transfer. There were no beds available in the [facility]. He is currently intubated. He has evidence of massive volume overload on exam with diffuse anasarca. I will ask nephrology to evaluate for consideration of slow low efficiency dialysis. He is currently on assist-control rate of 12, tidal volume of 500, 5 of PEEP, FiO2 of 1. Past Medical History:  No past medical history on file. Past Surgical History:  No past surgical history on file.     Medications: Scheduled Meds:   sodium chloride flush  10 mL IntraVENous 2 times per day    pantoprazole (PROTONIX) 40 mg injection  40 mg IntraVENous Daily    chlorhexidine  15 mL Mouth/Throat BID    famotidine (PEPCID) injection  20 mg IntraVENous BID     Continuous Infusions:   norepinephrine      fentaNYL      sodium chloride      propofol       PRN Meds:.sodium chloride flush, sodium chloride, ondansetron **OR** ondansetron, acetaminophen **OR** acetaminophen    Allergies:  Patient has no allergy information on record. Social History:   TOBACCO:   has no history on file for tobacco use. ETOH:   has no history on file for alcohol use. Family History:   No family history on file. ROS:  Review of Systems   Unable to perform ROS: Intubated       Physical Exam:    Vitals: Pulse (!) 38   Resp 12   Ht 6' 2\" (1.88 m)   Wt 196 lb 4.8 oz (89 kg)   SpO2 100%   BMI 25.20 kg/m²     Physical Exam  Vitals and nursing note reviewed. Constitutional:       Appearance: He is ill-appearing. Interventions: He is sedated and intubated. HENT:      Head: Normocephalic and atraumatic. Right Ear: External ear normal.      Left Ear: External ear normal.      Nose: Nose normal.      Mouth/Throat:      Mouth: Mucous membranes are moist.   Eyes:      Conjunctiva/sclera: Conjunctivae normal.      Pupils: Pupils are equal, round, and reactive to light. Cardiovascular:      Rate and Rhythm: Regular rhythm. Bradycardia present. Heart sounds: Normal heart sounds. Pulmonary:      Effort: Pulmonary effort is normal. He is intubated. Breath sounds: Normal breath sounds. Abdominal:      General: Abdomen is flat. Palpations: Abdomen is soft. Genitourinary:     Comments: Significant scrotal and penile edema noted. Musculoskeletal:         General: Swelling present. Cervical back: Neck supple. No rigidity. Right lower leg: Edema present. Left lower leg: Edema present. Skin:     General: Skin is warm and dry. CBC: No results for input(s): WBC, HGB, PLT in the last 72 hours. BMP:  No results for input(s): NA, K, CL, CO2, BUN, CREATININE, GLUCOSE in the last 72 hours. Hepatic: No results for input(s): AST, ALT, ALB, BILITOT, ALKPHOS in the last 72 hours.   Troponin: No results for input(s): TROPONINI in the last 72 hours.  BNP: No results for input(s): BNP in the last 72 hours. Lipids: No results for input(s): CHOL, HDL in the last 72 hours. Invalid input(s): LDLCALCU  ABGs: No results found for: PHART, PO2ART, IZT2GBU  INR: No results for input(s): INR in the last 72 hours. -----------------------------------------------------------------  No results found. EKG: Sinus bradycardia, nonspecific ST-T wave changes noted. Assessment and Plan     History of recent ruptured appendicitis; status post laparoscopic appendectomy. CT scan of the abdomen for follow-up. Continue orogastric tube and decompression. TPN for nutritional therapy. YOAN (acute kidney injury). Consultation with nephrology for consideration of possible SLED. Acute hypoxemic respiratory failure. Continue ventilatory support. Wean when feasible. History of atrial flutter now with sinus bradycardia. He was treated with high doses of oral amiodarone early in his hospitalization. Ask cardiology to evaluate. EF noted to be 50% at the outside facility. This is an elderly gentleman who is transferred to us with multisystem organ failure. Unfortunately risk for mortality is quite high at this point. We will continue with ongoing supportive care. He apparently was made a DNR prior to transfer. He is not to undergo CPR if he deteriorates however current level of care is acceptable. Please document 90 minutes of critical care time for patient assessment, chart review, discussion with staff, .       Reford Prakash, DO

## 2022-06-29 NOTE — CONSULTS
[facility]) Cardiology   Consult Note       Requesting MD:  Rissa Rowland DO   Admit Status:         Patient:    Bryon Keith  [id number]  1940         Chief Complaint: Atrial fibrillation and sinus bradycardia  HPI: Patient is a [de-identified] y.o. male was admitted June 19, 2022 for appendicitis which was ruptured. He underwent laparoscopic appendectomy. Postoperative he went into renal failure and respiratory failure required dialysis. He was intubated. He then developed atrial fibrillation with fast ventricular rate. He was started on IV amiodarone and converted to atrial fibrillation with slow ventricular rate and hypotensive. IV Levophed was started. She has been treated for septic shock and anemia postoperatively. Bleeding was probably secondary to use of Eliquis and postoperatively. He also developed DIC. Patient had history of right carotid endarterectomy. 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 There was a right common artery ostial stenosis. Postoperatively patient developed ascites and I will yes. CT scan showed possible liver cirrhosis. .  Chest x-ray showed pulmonary vascular congestion and left lower lobe consolidation. Review of Systems:  HENNT: normal  PULMONARY: normal   CVS:see history of present illness  ABD: denies any abdominal pain  PERIPHERL: normal  MSK: no swelling of the lower extremities  CNS: Denies any dizziness, syncopal episode or history of stroke  Renal: Denies any history of dysuria or frequency    Cardiac Specific Data:  Specialty Problems     None          Past Medical History:  No past medical history on file. Past Surgical History:  No past surgical history on file. Past Family History:  No family history on file.     Past Social History:  Social History     Socioeconomic History    Marital status: Single     Spouse name: Not on file    Number of children: Not on file    Years of education: Not on file    Highest education level: Not on file   Occupational History    Not on file   Tobacco Use    Smoking status: Not on file    Smokeless tobacco: Not on file   Substance and Sexual Activity    Alcohol use: Not on file    Drug use: Not on file    Sexual activity: Not on file   Other Topics Concern    Not on file   Social History Narrative    Not on file     Social Determinants of Health     Financial Resource Strain:     Difficulty of Paying Living Expenses: Not on file   Food Insecurity:     Worried About Running Out of Food in the Last Year: Not on file    Belia of Food in the Last Year: Not on file   Transportation Needs:     Lack of Transportation (Medical): Not on file    Lack of Transportation (Non-Medical):  Not on file   Physical Activity:     Days of Exercise per Week: Not on file    Minutes of Exercise per Session: Not on file   Stress:     Feeling of Stress : Not on file   Social Connections:     Frequency of Communication with Friends and Family: Not on file    Frequency of Social Gatherings with Friends and Family: Not on file    Attends Congregation Services: Not on file    Active Member of [address] [place] [address] or Organizations: Not on file    Attends Club or Organization Meetings: Not on file    Marital Status: Not on file   Intimate Partner Violence:     Fear of Current or Ex-Partner: Not on file    Emotionally Abused: Not on file    Physically Abused: Not on file    Sexually Abused: Not on file   Housing Stability:     Unable to Pay for Housing in the Last Year: Not on file    Number of [place] in the Last Year: Not on file    Unstable Housing in the Last Year: Not on file       Allergies:  Not on File    Prior to Admission medications    Not on File        Current Facility-Administered Medications   Medication Dose Route Frequency Provider Last Rate Last Admin    norepinephrine (LEVOPHED) 16 mg in sodium chloride 0.9 % 250 mL infusion  1-100 mcg/min IntraVENous Continuous Dena Cortés DO        fentaNYL (SUBLIMAZE) 2500 mcg in sodium chloride 0.9% 250 mL premix   mcg/hr IntraVENous Continuous Calixto Leon, DO        sodium chloride flush 0.9 % injection 10 mL  10 mL IntraVENous 2 times per day Calixto Leon, DO        sodium chloride flush 0.9 % injection 10 mL  10 mL IntraVENous PRN Calixto Leon, DO        0.9 % sodium chloride infusion   IntraVENous PRN Calixto Leon, DO        ondansetron (ZOFRAN-ODT) disintegrating tablet 4 mg  4 mg Oral Q8H PRN Calixto Leon, DO        Or    ondansetron [facility]) injection 4 mg  4 mg IntraVENous Q6H PRN Calixto Leon, DO        acetaminophen (TYLENOL) tablet 650 mg  650 mg Oral Q6H PRN Calixto Leon, DO        Or    acetaminophen (TYLENOL) suppository 650 mg  650 mg Rectal Q6H PRN Calixto Leon, DO        pantoprazole (PROTONIX) 40 mg in sodium chloride (PF) 10 mL injection  40 mg IntraVENous Daily Calixto Leon, DO        chlorhexidine (PERIDEX) 0.12 % solution 15 mL  15 mL Mouth/Throat BID Calixto Leon, DO        famotidine (PEPCID) 20 mg in sodium chloride (PF) 10 mL injection  20 mg IntraVENous BID Calixto Leon, DO        propofol injection  5-80 mcg/kg/min IntraVENous Continuous Calixto Leon, DO        atropine injection 1 mg  1 mg IntraVENous Once Lorri Haley MD        DOPamine (INTROPIN) 400 mg in dextrose 5 % 250 mL infusion  1-20 mcg/kg/min IntraVENous Continuous Lorri Haley MD            Current Infused Meds:   norepinephrine      fentaNYL      sodium chloride      propofol      DOPamine         Physical Exam:  Vitals:    06/29/22 1443   Pulse: 67   Resp: 17   SpO2:      No intake or output data in the 24 hours ending 06/29/22 1450  Estimated body mass index is 25.2 kg/m² as calculated from the following:    Height as of this encounter: 6' 2\" (1.88 m). Weight as of this encounter: 196 lb 4.8 oz (89 kg).     PHYSICAL EXAM:  HENNT: Intubated  PULMONARY: Intubated  CVS:Normal neck vein, heart sounds distant, irregular and slow l, no murmur  ABD: Recent surgery with ascites  PERIPHERL: Decreased  pulses  MSK: 2+ swelling of the lower extremities in the scrotum  CNS: Intubated and sedated  Labs:  Recent Labs     06/29/22  1429   WBC 20.6*   HGB 7.8*          Recent Labs     06/29/22  1441   K 3.5       CK, CKMB, Troponin: No results for input(s): CKTOTAL, CKMB, TROPONINI in the last 72 hours. Last 3 BNP:  No results for input(s): PROBNP in the last 72 hours. No results found. Assessment and Plan:    1. New onset atrial fibrillation post surgery. Now after IV amiodarone, ventricular rate is very slow   2. septic shock post laparoscopic appendectomy and DIC  3. Bleeding from use of Eliquis  4. Acute respiratory failure intubated, pneumonia  5. Liver cirrhosis with ascites  6. Right carotid endarterectomy  7. Renal failure on dialysis    Plan: Patient is a DNR. We will treat him conservatively. IV dopamine is better to maintain blood pressure and increased heart rate. Echocardiogram will be repeated prognosis is poor    I have spent 60 minutes reviewing the chart, taking history, examining the patient, discussion with the patient and the family concerning the finding, diagnoses and treatment plan. This dictation was performed using [address] [place]. Mistake and misspelling may have been created without  realizing them. Please notify me for clarification.   Thank you    Dick Hall MD   6/29/2022, 2:50 PM

## 2022-06-29 NOTE — ACP (ADVANCE CARE PLANNING)
Advance Care Planning     Advance Care Planning (ACP) Physician/NP/PA (Provider) Conversation      Date of ACP Conversation: 6/29/2022    Conversation Conducted with:    Healthcare Decision Maker: Next of Kin by law (only applies in absence of above) (name) Vivien Givens, [address] Decision Maker:    Current Designated Health Care Decision Maker:    Primary Decision Maker (Active): Linda Skinner - Next of Kin - [phone]  Care Preferences:      Resuscitation:  \"CPR works best to restart the heart when there is a sudden event, like a heart attack, in someone who is otherwise healthy. Unfortunately, CPR does not typically restart the heart for people who have serious health conditions or who are very sick. \"    \"In the event your heart stopped as a result of an underlying serious health condition, would you want attempts to be made to restart your heart (answer \"yes\" for attempt to resuscitate) or would you prefer a natural death (answer \"no\" for do not attempt to resuscitate)? \"   No    Length of Voluntary ACP Conversation in minutes: 15 minutes included w/ initial consult time    Ally Roberts PA-C

## 2022-06-29 NOTE — PROGRESS NOTES
Pt to ICU from other facility already intubated and place on vent at   VC, 12 500 +5 100%  EET at 26cm at lip

## 2022-06-29 NOTE — CONSULTS
Palliative Care Consult Note    6/30/2022 7:32 AM  Subjective:  Admit Date: 6/29/2022  PCP: Reynold Dueñas    Date of Service: 6/30/2022    Reason for Consultation:  Goals of Care, Code Status, Family Support     History Obtained From: EMR/Patient and their Family    History Of Present Illness: The patient is a [de-identified] y.o. male with PMH bilateral carotid stenosis, HLD who presented to this facility as a transfer from [facility] for higher level of care. He presented there on 06/19/2022 w/ acute perforated appendicitis, underwent laparoscopy appendectomy and post-operatively developed atrial flutter and septic shock w/ respiratory failure and required emergent intubation on 06/27/2022. He initially received Merrem and was transitioned to Zosyn. For atrial flutter, he was dosed with Amiodarone and Nadalol and developed bradycardia. He subsequently developed renal failure  and was started on hemodialysis on 06/27/2022. He had been receiving Eliquis, developed DIC and was given 2 doses of Kcentra. Mr. Deon Weinberg was transferred to this facility for higher level of care. Palliative care was consulted for goals of care. Past Medical History:        Diagnosis Date    Bilateral carotid artery stenosis 10/15/2020    Hypercholesteremia 6/27/2022     Past Surgical History:        Procedure Laterality Date    APPENDECTOMY      KNEE SURGERY       Home Medications:  Prior to Admission medications    Not on File     Allergies:    Patient has no known allergies. Social History:    The patient currently lives independently  Tobacco:   has no history on file for tobacco use. Alcohol:   has no history on file for alcohol use. Illicit Drugs: None known     Family History:  No family history on file.     Review of Systems:   Unable to obtain 2/2 intubation/sedation     Physical Examination:  BP (!) 112/52   Pulse 56   Temp (!) 96.6 °F (35.9 °C)   Resp 13   Ht 6' 2\" (1.88 m)   Wt 186 lb 11.2 oz (84.7 kg)   SpO2 (!) 85% BMI 23.97 kg/m²   General appearance: [de-identified] yo male, intubated, acutely ill appearing  Head: Normocephalic, without obvious abnormality, atraumatic  Eyes: conjunctivae/corneas clear. PERRL, EOM's intact. Ears: normal external ears and nose, throat without exudate  Neck: no JVD, supple, symmetrical, trachea midline   Lungs: diminished at bases otherwise clear to auscultation bilaterally,no rales or wheezes   Heart: bradycardic, S1, S2 normal, no murmur  Abdomen:soft, no grimacing w/ palpation; non-distended, few sluggish bowel sounds Incisions from lap appendectomy well approximated without oozing  Extremities:2+ peripheral edema to include penile/scrotal edema,  No erythema, no grimacing to palpation.  Femoral line site with blood oozing around catheter   Skin: Warm, dry  Neurologic: Intubated/sedated    Diagnostic Data:  CBC:  Recent Labs     06/29/22  1429 06/30/22  0430   WBC 20.6* 24.6*   HGB 7.8* 9.1*   HCT 23.1* 27.5*    216     BMP:  Recent Labs     06/29/22  1429 06/29/22  1441 06/30/22  0430     --  136   K 3.9 3.5 4.0     --  102   CO2 25  --  21*   BUN 45*  --  55*   CREATININE 3.4*  --  4.1*   CALCIUM 7.2*  --  7.2*     Recent Labs     06/29/22  1429 06/30/22  0430   * 214*   ALT 35 40   BILITOT 1.0 1.1   ALKPHOS 122 174*     Palliative Performance Scale:  [x] 10% Bed Bound  Extensive disease  Total care  Mouth care only  Drowsy/coma    Palliative Review of Advance Directives:     Surrogate Decision Maker:Linda Skinner-NOK/Niece     Durable Power of :No    Advanced Directives/Living [place]: No    Out of hospital medical orders in place to reflect resuscitation status (MOLST/POLST): No    Information Sharing:  Patient's awareness of illness:  [] Terminal [] Life-Threatening [] Serious [] Non life-threatening [] Not serious   [x] Not discussed    Family awareness of illness:   [] Terminal [x] Life-Threatening [] Serious [] Nonlife-threatening [] Not serious   [] Not discussed    Assessment/Plan:  Principal Problem:    YOAN (acute kidney injury) ([facility]  Active Problems:    Bilateral carotid artery stenosis    Septic shock (HCC)    Acute appendicitis    Acute respiratory failure with hypoxia (HCC)    Coagulopathy (HCC)  Resolved Problems:    * No resolved hospital problems. *       Visit Summary:  Chart reviewed, patient discussed with consulting service and nursing staff. Reviewed health issues, work up and treatment plan as well as factors that lead to hospitalization. Patient seen at bedside with multiple nurses present in room. Mr. William Harden is intubated, sedated. With lightened sedation he was noted to be biting ET tube. He has appearance of anasarca and is bradycardic. Nephrology, Cardiology has been consulted. I called and spoke with patient's niece, Madyson Lofton, who is his next of kin and healthcare surrogate. She lives out of area but is traveling to be at patient's bedside and assist with decision making. Goals of care are to wean him from ventilator with hopes he will recover. Linda confirms that patient is do not resuscitate and further states he would not want his life prolonged for a lengthy time on a ventilator. We discussed current clinical concerns to include possible DIC, respiratory failure, septic shock. Will continue to follow and have further conversations regarding goals of care pending patient's improvement or decline. Recommendations:     1. Palliative Care-GOC continue aggressive support with goal of weaning from ventilator. Patient would not want to have prolonged ventilator mgmt per his HCS. Code status: DNR ACP completed   2. Acute respiratory failure w/ hypoxia-emergently intubated 06/27/2022, currently requiring 50% FiO2 w/ 5 PEEP   3. Acute renal failure-HD initiated 06/27/2022, Nephrology consulted   4.  Acute appendicitis w/ subsequent development of septic shock-s/p lap appendectomy 06/19/2022, blood cultures at OSH w/o thelma, received Meropenem 06/19/2022-06/26/2022 and switched to Zosyn 06/27/2022. MRSA nares reported as negative   5. Coagulopathy w/ suspected DIC-Kcentra given 06/28/2022 and 06/29/2022  6. Atrial flutter now w/ bradycardia-dopamine recommended by Cardiology, Echo requested   7. Hx of bilateral carotid stenosis-followed as OP by Dr. Jesica Love at [facility]    Thank you for consulting palliative care and allowing us to participate in the care of the patient.     CounselingTopics: Goals of care, Code Status, Disease process education, pt/family support    Time Spent Counseling > 50%:  YES                                   Total Time Spent with patient/family counseling, workup/treatment review, discussion w/ RN/Hospitalist, placement of orders/preparation of this note: 73 minutes    Electronically signed by Charmayne Chamorro, PA-C on 6/30/2022 at 7:32 AM    (Please note that portions of this note were completed with a voice recognition program.  Efforts were made to edit the dictations but occasionally words are mis-transcribed.)

## 2022-06-29 NOTE — PROGRESS NOTES
Nara Bradley arrived to room #146. Presented with: YOAN, bradycardia   Mental Status: Patient is sedated on vent  Vitals:    06/29/22 1700   BP: (!) 111/41   Pulse: 67   Resp: 13   Temp:    SpO2: 93%     Patient safety contract and falls prevention contract reviewed with patient Yes. Oriented Patient to room.   Call light within reach - NA   Needs, issues or concerns expressed at this time: no.      Electronically signed by Gail Allen, RN on 6/29/2022 at 5:41 PM

## 2022-06-30 PROBLEM — Z51.5 ENCOUNTER FOR PALLIATIVE CARE: Status: ACTIVE | Noted: 2022-01-01

## 2022-06-30 PROBLEM — D68.9 COAGULOPATHY (HCC): Status: ACTIVE | Noted: 2022-01-01

## 2022-06-30 PROBLEM — J96.01 ACUTE RESPIRATORY FAILURE WITH HYPOXIA (HCC): Status: ACTIVE | Noted: 2022-01-01

## 2022-06-30 NOTE — CARE COORDINATION
Date / Time of Evaluation:   6/30/2022    11:48 AM  Assessment Completed by:   DOMENICO Mcgrath      Patient Name:   Isaak Whittington  MRN:   [id number]  YOB: 1940    Patient Admission Status:       Patient Contact Information:    [address] [phone] (home)   Telephone Information:   Mobile [phone]     Above information verified? []   Yes  []   No    (Best Practice:   Have patient/caregiver verify above address and phone number by stating out loud their current address and reachable phone number. Initial Assessment Completed at bedside with:      []   Patient  []   Family/Caregiver/Guardian   []   Other:      Current PCP:    Fabian Royal    PCP verified? []   Yes  []   No    Emergency Contacts:    Extended Emergency Contact Information  Primary Emergency Contact: Antonieta Wagnerin  Mobile Phone: [phone]  Relation: Other  Preferred language: English   needed? No  Secondary Emergency Contact: Aleksandr Harp  Mobile Phone: [phone]  Relation: Other  Preferred language: English   needed? No    Advance Directives:    Does Mr. Kali Clay have an advance directive in his electronic medical record? []   Yes  []   No    Code Status:   DNR      Have you been vaccinated for COVID-19 (SARS-CoV-2)? []   Yes  []   No                   If so, when?     Which :         []   Pf[employer]  []   Moderna  []   [employer]  []   Other:       Do you have any of the following unmet social needs that would keep you from returning home safely:    []   Yes  []   No                    Unmet Social Needs:           []   Living Situation/Housing  []   Food  []   Stroke Education   []   Utilities  []   Personal Safety  []   Financial Strain  []   Employment  []   Mental Health  []   Substance Abuse  []   Transportation Barriers    Additional Unmet Social Needs Notes:           Financial:    Payor: Lara Travis / Plan: MEDICARE PART A AND B / Product Type: *No Product type* /     Pre-Cert required for SNF:     []   Yes  []   No    Have Long Term Care Insurance:      []   Yes  []   No      Pharmacy:  No Pharmacies Listed  Potential assistance purchasing medications? []   Yes  []   No      ADLS:       Support System:          Current Home Environment:       Steps:       []   Yes  []   No    If yes, how many? Plans to RETURN to current housing:     []   Yes  []   No    Barriers to RETURNING to current housing:        Currently ACTIVE with Home Health CARE:      []   Yes  []   No    Home Health Care Agency:        DME Provider:         Had [employer] [facility] prior to admission:      []   Yes  []   No    Oxygen Company:       Has a pulse oximetry unit at home:     []   Yes  []   No    Informed of need to bring portable home O2 tank to hospital on day of DISCHARGE:      []   Yes  []   No    Name of person committed to bringing portable tank at discharge: Active with HD/PD prior to admission:             []   Yes  []   No    Nephrologist:     HD Center:         Transition Plan:       Transportation PLAN for Discharge:      Factors facilitating achievement of predicted outcomes:     Barriers to discharge:        Patient Deficits:    []   Yes   []   No    If yes:    []   Confusion/Memory  []   Visual  []   Motor/Sensory         []   Right arm         []   Right leg         []   Left arm         []   Left leg  []   Language/Speech         []   Aphasia         []   Dysarthria         []   Swallow         Hank Coma Scale  Eye Opening:  To speech  Best Verbal Response: None  Best Motor Response: Obeys commands  Hank Coma Scale Score: 10  OPO Notified: Not yet    Swallow Screening  Is the patient unable to remain alert for testing?: (!) Yes  Is the patient on a modified diet (thickened liquids) due to pre-existing dysphagia?: No  Is there presence of existing enteral tube feeding via the stomach or nose?: No  Is there presence of head-of-bed restrictions (less than 30 degrees)?: No  Is there presence of tracheotomy tube?: No  Is the patient ordered nothing-by-mouth status?: (!) Yes    Patient Deficit Notes: Additional CM/SW Notes:  ***        Don and/or his family were provided with choice of provider:    []   Yes   []   No        []   Stroke education booklet reviewed and given to patient/family/caregiver/guardian. All questions answered all questions answered appropriately and efficiently per family.       Erin Medina, [address] Management  Phone:   ***          Fax:   ***

## 2022-06-30 NOTE — PROGRESS NOTES
BP has been very labile over the course of SLED today. Multiple titrations of levophed and the addition of jh-synephrine have resulted in a continued low bp 74/42 even at max doses of each. Dr. Kevyn Byers notified of this, UF rate at 100 (taken down to 50 per order). Pt's niece, Linda, an anesthesiologist, brought back to room, case reviewed. Pt's bp continued to plummet--51/25, HR jumps into RVR at times up to 150s. Dr. Kevyn Byers updated that family wanted to stop dialysis. Blood returned with bolus, bp improved, Jh-synepherine stopped, levophed remains at max. HD RN notified. Family to bedside. Will continue to monitor.

## 2022-06-30 NOTE — PROGRESS NOTES
This  visited with family to provide support.  spoke about Hospice and extubation and they thought the Hospice order had been put in. This  spoke with Lena Laura, ICU nurse, who put in the a Hospice consult. Also, contacted Jean-Pierre Estes, Hospice chaplain, to visit with family to do admission. April also contacted Dr. Tata Bentley who noted pt's family is ready to move forward with Hospice care. Pt's family was thankful for support.      Electronically signed by Carmen Hennessy on 6/30/2022 at 3:02 PM

## 2022-06-30 NOTE — FLOWSHEET NOTE
06/29/22 1932   Encounter Summary   Encounter Overview/Reason  Advance Care Planning   Service Provided For: Patient  (in ICU)   Referral/Consult From: Nurse   Support System Family members   Complexity of Encounter Moderate   Begin Time 1830   End Time  1845   Total Time Calculated 15 min   Advance Care Planning   Type ACP conversation   Assessment/Intervention/Outcome   Assessment Coping   Intervention Prayer (assurance of)/Guys   Outcome Coping     Received referral from pt's nurse to assist with LW per daughter's request. Visit pt again. Pt is on the ventilator and unable to talk, no family present at this time. Discussed with Gilford Saba, pt's nurse and [facility]. Will follow-up tomorrow when pt is able to complete LW.   Electronically signed by Hernando Ospina on 6/29/2022 at 7:37 PM

## 2022-06-30 NOTE — PROGRESS NOTES
The pts niece signed the adm forms admitting the pt to the [facility] for extubation. It is ok to dc the pt, call [phone] to give report then transfer the pt to the [place] [id number].. Emotional and sc support provided.

## 2022-06-30 NOTE — DISCHARGE SUMMARY
Discharge Summary    Reid Nguyen  :  1940  MRN:  [id number]    Admit date:  2022  Discharge date: 2022     Admitting Physician:  Becca Hernandez DO    Advance Directive: DNR    Consults: cardiology, nephrology, palliative care and hospice    Primary Care Physician:  Olivia Hobbs    Discharge Diagnoses:  Principal Problem:    YOAN (acute kidney injury) [facility])  Active Problems:    Bilateral carotid artery stenosis    Septic shock ([facility]    Acute appendicitis    Acute respiratory failure with hypoxia ([facility] [address]    Coagulopathy ([facility]    Palliative care patient  Resolved Problems:    * No resolved hospital problems. *      Significant Diagnostic Studies:   CT ABDOMEN PELVIS WO CONTRAST Additional Contrast? None    Result Date: 2022  NO PRIOR REPORT AVAILABLE Exam: CT OF THE ABDOMEN/PELVIS WITHOUTIV CONTRAST Clinical data: Recent appendectomy and new onset ascites. Technique: Axial imaging through the abdomen and pelvis without intravenous contrast. Oral contrast was administered. Reformatted/MPR images were performed. Radiation dose: CTDIvol =32.02 mGy, DLP =1678 mGy x cm. Limitations: Lack of intravenous contrast limits evaluation of the solid organs. Prior studies: No prior studies submitted. Findings: Lung bases: Bilateral moderate effusion with basal atelectatic changes. Liver:Unremarkable size andcontour. Normal density. No evidence of mass. No evidence of dilated ducts. Gallbladder Fossa: Diffuse hyperdensity in gallbladder likely sludge. Stephanie Karst Spleen: Grossly unremarkable. Pancreas:  Grossly unremarkable. Adrenal glands: Grossly unremarkable size, contour and density. Kidneys: In anatomic position. Grossly unremarkablerenal size, contour and density. No renal or ureteral calculi. No evidence of a renal mass or cyst. Perinephric space is unremarkable. Retroperitoneum: No enlarged retroperitoneal lymphadenopathy. Diffuse atheromatous calcifications in aorta and iliac arteries. IVC is unremarkable.  Peritoneal cortical sulci with mild ventricular dilatation. Findings consistent with cortical atrophy. In addition, there is decreased attenuation at the right frontal horn of the lateral ventricle consistent with small vessel ischemic change No hydrocephalus or abnormal extra-axial fluid collections are present. The posterior fossa is unremarkable. The skull base and calvarium are intact. The included portions of the paranasal sinuses and mastoid air cells are clear. 1. No evidence of acute intracranial hemorrhage, mass, mass effect or infarct 2. Model cortical atrophy, age-appropriate 3. Small vessel ischemic changes in the frontal part of the right lateral ventricle Recommendation: Follow up as clinically indicated. All CT scans at this facility utilize dose modulation, iterative reconstruction, and/or weight based dosing when appropriate to reduce radiation dose to as low as reasonably achievable. Electronically Signed by Ron Bailey at 30-Jun-2022 03:51:39 AM             XR CHEST PORTABLE    Result Date: 6/29/2022  NO PRIOR REPORT AVAILABLE Exam: X-RAY OF THECHEST Clinical data:ETT/OGT placement. Technique:Two portable views of the chest. Prior studies: No prior studies submitted. Findings: An ETT is in place with the tip 3.7 cm above the flo. An orogastric tube is in place with the tip in the stomach. A right internal jugular central line catheter is in place with the tip within the SVC. There is pulmonary vascular congestion. Left lower lobe atelectasis and or pneumonia with small left pleural effusion. Cardiac silhouette is within normal limits. No acute osseous abnormality is detected. An ETT is in place with the tip 3.7 cm above the flo. An orogastric tube is in place with the tip in the stomach. A right internal jugular central line catheter is in place with the tip within the SVC. There is pulmonary vascular congestion.   Left lower lobe atelectasis and or pneumonia with small left pleural despite its use images extremely poor   Signature   ----------------------------------------------------------------  Electronically signed by Karie Machado MD(Interpreting physician)  on 06/30/2022 09:05 AM  ----------------------------------------------------------------  2D Measurements and Calculations(cm)   LVIDd: 3.58 cm                      LVIDs: 2.06 cm  IVSd: 1.15 cm  LVPWd: 1.16 cm                      AO Root Dimension: 3 cm  % Ejection Fraction: 74.5 %                                      LV Systolic dimension: 6.60BM  LV dimension: 3.58 cm               LV PW diastolic: 4.32PH                                      LVOT diameter: 2 cm                                      RA Systolic pressure: 3mmHg  Ascending Aorta: 2.7 cm  Doppler Measurements and Calculations                                          MV Peak E-Wave: 61.7 cm/s                                         MV Peak A-Wave: 72.4 cm/s                                         MV E/A Ratio: 0.85 %  Estimated RAP:3 mmHg                   MV Mean velocity: NaNcm/s                                         MV Peak Gradient: 1.52 mmHg                                         MV P1/2t: 72 msec  MV E' septal velocity: 3.59cm/s        MVA by PHT3.06 cm^2  MV E' lateral velocity:10 cm/s        CBC:   Recent Labs     06/29/22  1429 06/30/22  0430   WBC 20.6* 24.6*   HGB 7.8* 9.1*    216     BMP:    Recent Labs     06/29/22  1429 06/29/22  1441 06/30/22  0430     --  136   K 3.9 3.5 4.0     --  102   CO2 25  --  21*   BUN 45*  --  55*   CREATININE 3.4*  --  4.1*   GLUCOSE 113*  --  94     INR:   Recent Labs     06/29/22  1429 06/30/22  0430   INR 1.95* 1.73*     Lipids: No results for input(s): CHOL, HDL in the last 72 hours.     Invalid input(s): LDLCALCU  ABGs:  Recent Labs     06/29/22  1441   PHART 7.430   QBU0UJN 39.0   PO2ART 446.0*   WLZ9VHP 25.9   BEART 1.5   HGBAE 9.0*   X1JXZCLP 96.9   CARBOXHGBART 1.7   02THERAPY Unknown     HgBA1c:  No results for input(s): LABA1C in the last 72 hours. Procedures: Slow low efficiency dialysis, mechanical ventilation. Hospital Course: Mr. Kusum Barrera was excepted in transfer from [place], [place] on 6/29. He presented to that facility on 6/19 with a ruptured appendix. He underwent laparoscopic appendectomy and initially postoperatively, he did well. Unfortunately his hospitalization was complicated with acute kidney injury. This worsened to the point where he required the initiation of dialysis. He then was subsequently intubated because of respiratory distress. He was transferred to our facility for higher level of care. Consultation was initiated with nephrology and cardiology. We attempted slow low efficiency dialysis in the morning of 6/30 and he was unable to tolerate the treatment in spite of using 3 different pressors. His niece was his decision maker and we had extensive counseling with palliative care and the decision was made to withdraw care and transfer to inpatient hospice. This will be done later this afternoon. Physical Exam:  Vital Signs: BP (!) 87/41   Pulse 87   Temp (!) 96.6 °F (35.9 °C)   Resp 18   Ht 6' 2\" (1.88 m)   Wt 186 lb 11.2 oz (84.7 kg)   SpO2 96%   BMI 23.97 kg/m²   General appearance:. Alert and Cooperative   HEENT: Normocephalic. Chest: clear to auscultation bilaterally without wheezes or rhonchi. Cardiac: Normal heart tones with regular rate and rhythm, S1, S2 normal. No murmurs, gallops, or rubs auscultated. Abdomen:soft, non-tender; normal bowel sounds, no masses, no organomegaly. Extremities: No clubbing or cyanosis. No peripheral edema. Peripheral pulses palpable. Neurologic: Grossly intact. Discharge Medications:         Medication List      You have not been prescribed any medications. Disposition: Patient will be transferred to inpatient hospice. Time spent on discharge 40 minutes.     Signed:  Xavier Milan DO

## 2022-06-30 NOTE — PROGRESS NOTES
This  was called to be with family to provide spiritual care. Pt was having dialysis but his blood pressure dropping. Family members had asked for dialysis to be stopped. This  was present with family members and friends. Spiritual care was provided with sustaining presence, support, and prayer. Family members expressed gratitude for spiritual care. Palliative care will follow-up to support pt and family.      Electronically signed by Jo-Ann Cheung on 6/30/2022 at 1:37 PM

## 2022-06-30 NOTE — PROGRESS NOTES
Dr. Tata Bentley spoke extensively with family about the patient's condition and course of treatment. Family has decided to move forward with hospice care. Report given to Lázaro Anne RN and ASHLEY Pineda in ICU.

## 2022-06-30 NOTE — PROGRESS NOTES
Rosina Rodriguez to bedside at family's request for spiritual care. Prayer at bedside. Dr. Dk Cruz updated on phone of events.

## 2022-06-30 NOTE — PROGRESS NOTES
Nephrology ([facility]) Progress Note      Patient:  Denzel Mccracken  YOB: 1940  Date of Service: 6/30/2022  MRN: [id number]   Acct: [de-identified]   Primary Care Physician: Reynold Dueñas  Advance Directive: DNR  Admit Date: 6/29/2022       Hospital Day: 1  Referring Provider: Myesha Richey DO    Patient independently seen and examined, Chart, Consults, Notes, Operative notes, Labs, Cardiology, and Radiology studies reviewed as available. Chief complaint: Abnormal labs. Subjective:  Denzel Mccracken is a [de-identified] y.o. male for whom we were consulted for evaluation and treatment of acute kidney injury/dialysis dependent. He presented to [facility] with acute abdomen/ruptured appendix. He underwent laparotomy postoperatively suffered sepsis, acute kidney injury, respiratory failure. He was intubated and hemodialysis treatment was initiated via left femoral dialysis catheter. He also had an episode of atrial fibrillation started on intravenous amiodarone. IV Levophed was also initiated for the treatment of sepsis/septic shock. He is now transferred to [address] [place] [address] for continuation of care that include vent management, dialysis and cardiac care. CT scan of the abdomen shows possible cirrhosis of liver as well. Nurses has noticed that he is bleeding from multiple site of his lines. He has possible early DIC. Nephrology is consulted for continuation of dialysis. This morning patient was seen in ICU, intubated/sedated and has been on at least 2 pressors. He has minimal to no urine output. His family is at the bedside. Allergies:  Patient has no known allergies.     Medicines:  Current Facility-Administered Medications   Medication Dose Route Frequency Provider Last Rate Last Admin    albumin human 25 % IV solution 12.5 g  12.5 g IntraVENous PRN Sandro Blair MD        diphenhydrAMINE (BENADRYL) injection 25 mg  25 mg IntraVENous Q4H PRN MD Chava Willoughby acetaminophen (TYLENOL) tablet 500 mg  500 mg Oral Q4H PRN [facility] MD Delfina        norepinephrine (LEVOPHED) 16 mg in sodium chloride 0.9 % 250 mL infusion  1-100 mcg/min IntraVENous Continuous Georgann Caldron, DO 14.1 mL/hr at 06/30/22 0827 15 mcg/min at 06/30/22 0827    fentaNYL (SUBLIMAZE) 2500 mcg in sodium chloride 0.9% 250 mL premix   mcg/hr IntraVENous Continuous Barakgann Caldron, DO 2.5 mL/hr at 06/30/22 0300 25 mcg/hr at 06/30/22 0300    sodium chloride flush 0.9 % injection 10 mL  10 mL IntraVENous 2 times per day Georgann Caldron, DO   10 mL at 06/30/22 0740    sodium chloride flush 0.9 % injection 10 mL  10 mL IntraVENous PRN Barakgann Caldron, DO        0.9 % sodium chloride infusion   IntraVENous PRN Barakgann Caldron, DO        ondansetron (ZOFRAN-ODT) disintegrating tablet 4 mg  4 mg Oral Q8H PRN Barakgann Caldron, DO        Or    ondansetron [facility]) injection 4 mg  4 mg IntraVENous Q6H PRN Barakgann Caldron, DO        acetaminophen (TYLENOL) tablet 650 mg  650 mg Oral Q6H PRN Barakgann Caldron, DO        Or    acetaminophen (TYLENOL) suppository 650 mg  650 mg Rectal Q6H PRN Barakgann Caldron, DO        pantoprazole (PROTONIX) 40 mg in sodium chloride (PF) 10 mL injection  40 mg IntraVENous Daily Barakgann Caldron, DO   40 mg at 06/30/22 0739    chlorhexidine (PERIDEX) 0.12 % solution 15 mL  15 mL Mouth/Throat BID Barakgann Caldron, DO   15 mL at 06/30/22 0739    famotidine (PEPCID) 20 mg in sodium chloride (PF) 10 mL injection  20 mg IntraVENous BID Barakgann Caldron, DO   20 mg at 06/30/22 1420    propofol injection  5-80 mcg/kg/min IntraVENous Continuous Georgann Caldron, DO   Stopped at 06/30/22 0200    DOPamine (INTROPIN) 400 mg in dextrose 5 % 250 mL infusion  1-20 mcg/kg/min IntraVENous Continuous Vilma Francisco MD 25 mL/hr at 06/30/22 0229 7.5 mcg/kg/min at 06/30/22 0229    perflutren lipid microspheres (DEFINITY) injection 1.65 mg  1.5 mL IntraVENous ONCE PRN Kellie Cox DO   1.65 mg at 06/29/22 1613    epoetin shira-epbx (RETACRIT) injection 10,000 Units  10,000 Units SubCUTAneous Once per day on Mon Wed Fri Tatyana Arreaga MD   10,000 Units at 06/29/22 1847       Past Medical History:  Past Medical History:   Diagnosis Date    Bilateral carotid artery stenosis 10/15/2020    Hypercholesteremia 6/27/2022       Past Surgical History:  Past Surgical History:   Procedure Laterality Date    APPENDECTOMY      KNEE SURGERY         Family History  No family history on file. Social History  Social History     Socioeconomic History    Marital status: Single     Spouse name: Not on file    Number of children: Not on file    Years of education: Not on file    Highest education level: Not on file   Occupational History    Not on file   Tobacco Use    Smoking status: Not on file    Smokeless tobacco: Not on file   Substance and Sexual Activity    Alcohol use: Not on file    Drug use: Not on file    Sexual activity: Not on file   Other Topics Concern    Not on file   Social History Narrative    Not on file     Social Determinants of Health     Financial Resource Strain:     Difficulty of Paying Living Expenses: Not on file   Food Insecurity:     Worried About Running Out of Food in the Last Year: Not on file    Belia of Food in the Last Year: Not on file   Transportation Needs:     Lack of Transportation (Medical): Not on file    Lack of Transportation (Non-Medical):  Not on file   Physical Activity:     Days of Exercise per Week: Not on file    Minutes of Exercise per Session: Not on file   Stress:     Feeling of Stress : Not on file   Social Connections:     Frequency of Communication with Friends and Family: Not on file    Frequency of Social Gatherings with Friends and Family: Not on file    Attends Yarsanism Services: Not on file    Active Member of Clubs or Organizations: Not on file    Attends Club or Organization 1430 -- -- -- 60 12 100 % -- --   06/29/22 1400 (!) 111/36 -- -- (!) 38 (!) 8 100 % -- --   06/29/22 1346 -- -- -- -- -- -- 6' 2\" (1.88 m) 196 lb 4.8 oz (89 kg)   06/29/22 1345 -- -- -- (!) 38 12 100 % -- --   06/29/22 1337 -- -- -- (!) 47 (!) 9 100 % -- --   06/29/22 1332 (!) 119/36 -- -- 80 17 100 % -- --       Intake/Output Summary (Last 24 hours) at 6/30/2022 0923  Last data filed at 6/30/2022 0500  Gross per 24 hour   Intake 714.15 ml   Output 180 ml   Net 534.15 ml     General: Intubated/sedated. And HEENT: Normocephalic atraumatic head/orotracheal intubation  Neck: Supple with no JVD  Chest:  Bilateral decreased breath sounds. CVS: Irregularly irregular S1 and S2. Abdominal: Soft and nondistended and hypoactive bowel sounds. Extremities: 2+ pedal edema  Skin: warm and dry without rash      Labs:  BMP:   Recent Labs     06/29/22 1429 06/29/22  1441 06/30/22  0430     --  136   K 3.9 3.5 4.0     --  102   CO2 25  --  21*   BUN 45*  --  55*   CREATININE 3.4*  --  4.1*   CALCIUM 7.2*  --  7.2*     CBC:   Recent Labs     06/29/22 1429 06/30/22  0430   WBC 20.6* 24.6*   HGB 7.8* 9.1*   HCT 23.1* 27.5*   MCV 94.7* 95.2*    216     LIVER PROFILE:   Recent Labs     06/29/22 1429 06/30/22  0430   * 214*   ALT 35 40   BILITOT 1.0 1.1   ALKPHOS 122 174*     PT/INR:   Recent Labs     06/29/22  1429 06/30/22  0430   PROTIME 22.6* 20.5*   INR 1.95* 1.73*     APTT:   Recent Labs     06/29/22  1429 06/30/22  0430   APTT 81.3* 55.9*     BNP:  No results for input(s): BNP in the last 72 hours. Ionized Calcium:No results for input(s): IONCA in the last 72 hours. Magnesium:No results for input(s): MG in the last 72 hours. Phosphorus:No results for input(s): PHOS in the last 72 hours. HgbA1C: No results for input(s): LABA1C in the last 72 hours.   Hepatic:   Recent Labs     06/29/22  1429 06/30/22  0430   ALKPHOS 122 174*   ALT 35 40   * 214*   PROT 4.2* 3.9*   BILITOT 1.0 1.1   LABALBU 1. 9* 2.2*     Lactic Acid: No results for input(s): LACTA in the last 72 hours. Troponin: No results for input(s): CKTOTAL, CKMB, TROPONINT in the last 72 hours. ABGs: No results for input(s): PH, PCO2, PO2, HCO3, O2SAT in the last 72 hours. CRP:  No results for input(s): CRP in the last 72 hours. Sed Rate:  No results for input(s): SEDRATE in the last 72 hours. Cultures:   No results for input(s): CULTURE in the last 72 hours. No results for input(s): BC, Derryl Drum in the last 72 hours. No results for input(s): CXSURG in the last 72 hours. Radiology reports as per the Radiologist  Radiology: XR CHEST PORTABLE    Result Date: 6/29/2022  NO PRIOR REPORT AVAILABLE Exam: X-RAY OF THECHEST Clinical data:ETT/OGT placement. Technique:Two portable views of the chest. Prior studies: No prior studies submitted. Findings: An ETT is in place with the tip 3.7 cm above the flo. An orogastric tube is in place with the tip in the stomach. A right internal jugular central line catheter is in place with the tip within the SVC. There is pulmonary vascular congestion. Left lower lobe atelectasis and or pneumonia with small left pleural effusion. Cardiac silhouette is within normal limits. No acute osseous abnormality is detected. An ETT is in place with the tip 3.7 cm above the flo. An orogastric tube is in place with the tip in the stomach. A right internal jugular central line catheter is in place with the tip within the SVC. There is pulmonary vascular congestion. Left lower lobe atelectasis and or pneumonia with small left pleural effusion. Recommendation: Follow up as clinically indicated. Electronically Signed by Gopal Luna MD at 29-Jun-2022 03:53:30 PM                Assessment   1. Acute kidney injury/dialysis dependent. 2.  Acute tubular necrosis. 3.  Sepsis/septic shock. 4.  Paroxysmal atrial fibrillation. 5.  Clinically volume overload. 6.  Cirrhosis of liver.   7.  Acute respiratory failure/intubated. 8.  Patient is currently DNR. 9.  Profound anemia    Plan:  1. Start CRRT/sustained low efficacy dialysis. 2.  Try to wean off vasopressor. 3.  Continue OLIVIER. 4.  Extensive discussion with his family member including daughter who is anesthesiologist.  5.  Overall prognosis looks poor.       Zion Marie MD  06/30/22  9:23 AM

## 2022-06-30 NOTE — PROGRESS NOTES
Four family members including Linda (niece) in to visit patient and received updates from Stephanie Samuel and Darell donnelly. Another friend, listed in emergency contacts, updated on phone. SLED set up by Yoel Whatley RN, from dialysis, and initiated at 09:10.

## 2022-06-30 NOTE — PROGRESS NOTES
Hospitalist Progress Note    Patient:  Isaak Whittington  YOB: 1940  Date of Service: 6/30/2022  MRN: [id number]   Acct: [de-identified]   Primary Care Physician: Fabian Royal  Advance Directive: DNR  Admit Date: 6/29/2022       Hospital Day: 1  Referring Provider: Tan Steele DO    Patient Seen, Chart, Consults, Notes, Labs, Radiology studies reviewed. Subjective:  Isaak Whittington is a [de-identified] y.o. male  whom we are following for ruptured appendix, status post appendectomy, acute kidney injury, volume overload, hypoxemic respiratory failure, atrial flutter. Slow low efficiency dialysis was initiated this morning. He did have a drop in his pressure. We have added a third pressor. His niece and family were present this morning for family conference. She is an anesthesiologist and has full understanding of the critical nature of his care at present. They have very realistic expectations. He is currently on assist-control rate of 12, tidal volume of 500, 5 of PEEP, FiO2 of 0.35. Allergies:  Patient has no known allergies.     Medicines:  Current Facility-Administered Medications   Medication Dose Route Frequency Provider Last Rate Last Admin    albumin human 25 % IV solution 12.5 g  12.5 g IntraVENous PRN Mitchell Remy MD        diphenhydrAMINE (BENADRYL) injection 25 mg  25 mg IntraVENous Q4H PRN Mitchell Remy MD        acetaminophen (TYLENOL) tablet 500 mg  500 mg Oral Q4H PRN Mitchell Remy MD        phenylephrine (CORY-SYNEPHRINE) 50 mg in dextrose 5 % 250 mL infusion   mcg/min IntraVENous Continuous Safford Cedric, DO        norepinephrine (LEVOPHED) 16 mg in sodium chloride 0.9 % 250 mL infusion  1-100 mcg/min IntraVENous Continuous Safford Cedric, DO 18.8 mL/hr at 06/30/22 0920 20 mcg/min at 06/30/22 0920    fentaNYL (SUBLIMAZE) 2500 mcg in sodium chloride 0.9% 250 mL premix   mcg/hr IntraVENous Continuous Safford Cedric, DO 2.5 mL/hr at 06/30/22 0300 25 mcg/hr at 06/30/22 0300    sodium chloride flush 0.9 % injection 10 mL  10 mL IntraVENous 2 times per day Louretta Milana, DO   10 mL at 06/30/22 0740    sodium chloride flush 0.9 % injection 10 mL  10 mL IntraVENous PRN Louretta Wrangell, DO        0.9 % sodium chloride infusion   IntraVENous PRN Louretta Wrangell, DO        ondansetron (ZOFRAN-ODT) disintegrating tablet 4 mg  4 mg Oral Q8H PRN Louretta Milana, DO        Or    ondansetron [facility]) injection 4 mg  4 mg IntraVENous Q6H PRN Louretta Wrangell, DO        acetaminophen (TYLENOL) tablet 650 mg  650 mg Oral Q6H PRN Louretta Wrangell, DO        Or    acetaminophen (TYLENOL) suppository 650 mg  650 mg Rectal Q6H PRN Louretta Milana, DO        pantoprazole (PROTONIX) 40 mg in sodium chloride (PF) 10 mL injection  40 mg IntraVENous Daily Louretta Wrangell, DO   40 mg at 06/30/22 0739    chlorhexidine (PERIDEX) 0.12 % solution 15 mL  15 mL Mouth/Throat BID Louretta Wrangell, DO   15 mL at 06/30/22 0739    famotidine (PEPCID) 20 mg in sodium chloride (PF) 10 mL injection  20 mg IntraVENous BID Louretta Wrangell, DO   20 mg at 06/30/22 5831    propofol injection  5-80 mcg/kg/min IntraVENous Continuous Louretta Wrangell, DO   Stopped at 06/30/22 0200    DOPamine (INTROPIN) 400 mg in dextrose 5 % 250 mL infusion  1-20 mcg/kg/min IntraVENous Continuous Bautista Whitlock MD 25 mL/hr at 06/30/22 0229 7.5 mcg/kg/min at 06/30/22 9812    perflutren lipid microspheres (DEFINITY) injection 1.65 mg  1.5 mL IntraVENous ONCE PRN Louretta Wrangell, DO   1.65 mg at 06/29/22 1613    epoetin shira-epbx (RETACRIT) injection 10,000 Units  10,000 Units SubCUTAneous Once per day on Mon Wed Fri Davey Amador MD   10,000 Units at 06/29/22 1847       Past Medical History:  Past Medical History:   Diagnosis Date    Bilateral carotid artery stenosis 10/15/2020    Hypercholesteremia 6/27/2022       Past Surgical History:  Past Surgical History:   Procedure Laterality Date  APPENDECTOMY      KNEE SURGERY         Family History  No family history on file. Social History  Social History     Socioeconomic History    Marital status: Single     Spouse name: Not on file    Number of children: Not on file    Years of education: Not on file    Highest education level: Not on file   Occupational History    Not on file   Tobacco Use    Smoking status: Not on file    Smokeless tobacco: Not on file   Substance and Sexual Activity    Alcohol use: Not on file    Drug use: Not on file    Sexual activity: Not on file   Other Topics Concern    Not on file   Social History Narrative    Not on file     Social Determinants of Health     Financial Resource Strain:     Difficulty of Paying Living Expenses: Not on file   Food Insecurity:     Worried About Running Out of Food in the Last Year: Not on file    Belia of Food in the Last Year: Not on file   Transportation Needs:     Lack of Transportation (Medical): Not on file    Lack of Transportation (Non-Medical):  Not on file   Physical Activity:     Days of Exercise per Week: Not on file    Minutes of Exercise per Session: Not on file   Stress:     Feeling of Stress : Not on file   Social Connections:     Frequency of Communication with Friends and Family: Not on file    Frequency of Social Gatherings with Friends and Family: Not on file    Attends Mandaeism Services: Not on file    Active Member of [address] [place] [address] [employer] or Organizations: Not on file    Attends Club or Organization Meetings: Not on file    Marital Status: Not on file   Intimate Partner Violence:     Fear of Current or Ex-Partner: Not on file    Emotionally Abused: Not on file    Physically Abused: Not on file    Sexually Abused: Not on file   Housing Stability:     Unable to Pay for Housing in the Last Year: Not on file    Number of [place] in the Last Year: Not on file    Unstable Housing in the Last Year: Not on file         Review of Systems:    Review of Systems   Unable to perform ROS: Intubated           Objective:  Blood pressure (!) 141/43, pulse 61, temperature (!) 96.6 °F (35.9 °C), resp. rate 20, height 6' 2\" (1.88 m), weight 186 lb 11.2 oz (84.7 kg), SpO2 96 %. Intake/Output Summary (Last 24 hours) at 6/30/2022 1006  Last data filed at 6/30/2022 0500  Gross per 24 hour   Intake 714.15 ml   Output 180 ml   Net 534.15 ml       Physical Exam  Vitals and nursing note reviewed. Constitutional:       Appearance: He is ill-appearing. Interventions: He is sedated and intubated. HENT:      Head: Normocephalic and atraumatic. Right Ear: External ear normal.      Left Ear: External ear normal.      Nose: Nose normal.      Mouth/Throat:      Mouth: Mucous membranes are moist.   Eyes:      Conjunctiva/sclera: Conjunctivae normal.      Pupils: Pupils are equal, round, and reactive to light. Cardiovascular:      Rate and Rhythm: Normal rate and regular rhythm. Heart sounds: Normal heart sounds. Pulmonary:      Effort: Pulmonary effort is normal. He is intubated. Breath sounds: Normal breath sounds. Abdominal:      General: Abdomen is flat. Palpations: Abdomen is soft. Musculoskeletal:         General: Swelling present. Cervical back: Neck supple. No rigidity. Right lower leg: Edema present. Left lower leg: Edema present. Comments: Anasarca   Skin:     General: Skin is warm and dry.          Labs:  BMP:   Recent Labs     06/29/22  1429 06/29/22  1441 06/30/22  0430     --  136   K 3.9 3.5 4.0     --  102   CO2 25  --  21*   BUN 45*  --  55*   CREATININE 3.4*  --  4.1*   CALCIUM 7.2*  --  7.2*     CBC:   Recent Labs     06/29/22  1429 06/30/22  0430   WBC 20.6* 24.6*   HGB 7.8* 9.1*   HCT 23.1* 27.5*   MCV 94.7* 95.2*    216     LIVER PROFILE:   Recent Labs     06/29/22  1429 06/30/22  0430   * 214*   ALT 35 40   BILITOT 1.0 1.1   ALKPHOS 122 174*     PT/INR:   Recent Labs     06/29/22  6473 06/30/22  0430   PROTIME 22.6* 20.5*   INR 1.95* 1.73*     APTT:   Recent Labs     06/29/22  1429 06/30/22 0430   APTT 81.3* 55.9*     BNP:  No results for input(s): BNP in the last 72 hours. Ionized Calcium:No results for input(s): IONCA in the last 72 hours. Magnesium:No results for input(s): MG in the last 72 hours. Phosphorus:No results for input(s): PHOS in the last 72 hours. HgbA1C: No results for input(s): LABA1C in the last 72 hours. Hepatic:   Recent Labs     06/29/22  1429 06/30/22 0430   ALKPHOS 122 174*   ALT 35 40   * 214*   PROT 4.2* 3.9*   BILITOT 1.0 1.1   LABALBU 1.9* 2.2*     Lactic Acid: No results for input(s): LACTA in the last 72 hours. Troponin: No results for input(s): CKTOTAL, CKMB, TROPONINT in the last 72 hours. ABGs: No results for input(s): PH, PCO2, PO2, HCO3, O2SAT in the last 72 hours. CRP:  No results for input(s): CRP in the last 72 hours. Sed Rate:  No results for input(s): SEDRATE in the last 72 hours. Cultures:   No results for input(s): CULTURE in the last 72 hours. No results for input(s): BC, Gerhardt Hones in the last 72 hours. No results for input(s): CXSURG in the last 72 hours. Radiology reports as per the Radiologist  Radiology: CT ABDOMEN PELVIS WO CONTRAST Additional Contrast? None    Result Date: 6/30/2022  NO PRIOR REPORT AVAILABLE Exam: CT OF THE ABDOMEN/PELVIS WITHOUTIV CONTRAST Clinical data: Recent appendectomy and new onset ascites. Technique: Axial imaging through the abdomen and pelvis without intravenous contrast. Oral contrast was administered. Reformatted/MPR images were performed. Radiation dose: CTDIvol =32.02 mGy, DLP =1678 mGy x cm. Limitations: Lack of intravenous contrast limits evaluation of the solid organs. Prior studies: No prior studies submitted. Findings: Lung bases: Bilateral moderate effusion with basal atelectatic changes. Liver:Unremarkable size andcontour. Normal density. No evidence of mass.  No evidence of dilated ducts. Gallbladder Fossa: Diffuse hyperdensity in gallbladder likely sludge. Barnetta Flower Spleen: Grossly unremarkable. Pancreas:  Grossly unremarkable. Adrenal glands: Grossly unremarkable size, contour and density. Kidneys: In anatomic position. Grossly unremarkablerenal size, contour and density. No renal or ureteral calculi. No evidence of a renal mass or cyst. Perinephric space is unremarkable. Retroperitoneum: No enlarged retroperitoneal lymphadenopathy. Diffuse atheromatous calcifications in aorta and iliac arteries. IVC is unremarkable. Peritoneal cavity: Bilateral femoral line catheters. Diffuse mesenteric inflammation and abdominal subcutaneous edema. Moderate ascites. Fluid collection right lower quadrant 11.2 x 6.7 cm Gastrointestinal tract: Focal wall irregularity and air pockets in right lower quadrant. Cecal diverticulosis. Appendix: Unremarkable Pelvis: Bladder is minimally distended with hyperdense foci and foleys insitu. Air in bladder lumen. Prostate calculi. Osseous structures: No acute or destructive bony process identified. Degenerative changes in lumbar vertebrae. 1. Right lower quadrant fluid collection 11.2 x 6.7 cm. Bilateral femoral line catheters. Diffuse mesenteric inflammation and abdominal subcutaneous edema 2. Moderate ascites. Focal wall irregularity and air pockets in caecum suspicious for a diverticula or walled-off perforation. No pneumoperitoneum. 3. Diffuse hyperdensity in gallbladder likely sludge. 4. Bladder is minimally distended with hyperdense foci likely clots, foci of emphysema and and foleys insitu. 5. Bilateral moderate effusion with basal atelectatic changes. Recommendation: Follow up as clinically indicated. All CT scans at this facility utilize dose modulation, iterative reconstruction, and/or weight based dosing when appropriate to reduce radiation dose to as low as reasonably achievable.  Electronically Signed by Haven Arthur MD at 30-Jun-2022 04:20:10 AM             CT HEAD WO CONTRAST    Result Date: 6/30/2022  NO PRIOR REPORT AVAILABLE Exam: CT OF THE BRAIN WITHOUT CONTRAST Clinical data: Uneven pupils. Technique: Contiguous axial images are obtained from the skull base to vertex without intravenous contrast. Reformatted/MPR images were performed. Radiation dose: CTDIvol =41.89 mGy, DLP =863 mGy x cm. Prior studies: No prior studies submitted. Findings: No acute intracranial abnormality is present. No evidence of acute cortical infarction, hemorrhage, mass or mass effect. There is prominence of the cortical sulci with mild ventricular dilatation. Findings consistent with cortical atrophy. In addition, there is decreased attenuation at the right frontal horn of the lateral ventricle consistent with small vessel ischemic change No hydrocephalus or abnormal extra-axial fluid collections are present. The posterior fossa is unremarkable. The skull base and calvarium are intact. The included portions of the paranasal sinuses and mastoid air cells are clear. 1. No evidence of acute intracranial hemorrhage, mass, mass effect or infarct 2. Model cortical atrophy, age-appropriate 3. Small vessel ischemic changes in the frontal part of the right lateral ventricle Recommendation: Follow up as clinically indicated. All CT scans at this facility utilize dose modulation, iterative reconstruction, and/or weight based dosing when appropriate to reduce radiation dose to as low as reasonably achievable. Electronically Signed by Power Soto at 30-Jun-2022 03:51:39 AM             XR CHEST PORTABLE    Result Date: 6/29/2022  NO PRIOR REPORT AVAILABLE Exam: X-RAY OF [facility] Clinical data:ETT/OGT placement. Technique:Two portable views of the chest. Prior studies: No prior studies submitted. Findings: An ETT is in place with the tip 3.7 cm above the flo. An orogastric tube is in place with the tip in the stomach.   A right internal jugular central line catheter is in place with the tip within the SVC. There is pulmonary vascular congestion. Left lower lobe atelectasis and or pneumonia with small left pleural effusion. Cardiac silhouette is within normal limits. No acute osseous abnormality is detected. An ETT is in place with the tip 3.7 cm above the flo. An orogastric tube is in place with the tip in the stomach. A right internal jugular central line catheter is in place with the tip within the SVC. There is pulmonary vascular congestion. Left lower lobe atelectasis and or pneumonia with small left pleural effusion. Recommendation: Follow up as clinically indicated. Electronically Signed by Beverley Quintero MD at 29-Jun-2022 03:53:30 PM             ECHO 2D WO COLOR DOPPLER COMPLETE    Result Date: 6/30/2022  Transthoracic Echocardiography Report (TTE)  Demographics   Patient Name   Jennifer Schaefer    Date of Study            06/29/2022   MRN            [id number]        Gender                   Male   Date of Birth  1940    Room Number              [id number]   Age            [de-identified] year(s)   Height:        74 inches     Referring Physician      Hailey Bryant MD   Weight:        196 pounds    Sonographer              Hanna Pink [facility]   BSA:           2.15 m^2      Interpreting Physician   Mirella Charlton MD   BMI:           25.17 kg/m^2  Procedure Type of Study   TTE procedure:ECHO 2D W/DOPPLER/COLOR/CONTRAST. Study Location: Echo Lab Technical Quality: Poor visualization due to patient on ventilator. Patient Status: Inpatient Contrast Medium: Definity. Amount - 2 ml BP: 144/81 mmHg Indications:Dyspnea/SOB and Atrial fibrillation.   Conclusions   Summary  Technically difficult study with few images of adequate quality for  interpretation [nonexistent Doppler interrogation]  Left ventricle is normal size with well-preserved systolic function EF in  excess of 60%  Mild concentric left ventricular hypertrophy with mild [grade 1] diastolic  dysfunction  Left atrium appears normal size  Poor visualization of the aortic valve which clearly appears to be  thickened but not further characterized because of absence of Doppler  interrogation  Mitral valve appears normally mobile  Right-sided chambers appear to be normal size with preserved RV systolic  function  IVC dimension and inspiratory motion are normal consistent with normal  right atrial filling pressures  Aortic root and ascending segment measure within normal limits  No significant pericardial effusion  Definity contrast utilized in an attempt to better define endocardial  borders but despite its use images extremely poor   Signature   ----------------------------------------------------------------  Electronically signed by Ru Woods MD(Interpreting physician)  on 06/30/2022 09:05 AM  ----------------------------------------------------------------  2D Measurements and Calculations(cm)   LVIDd: 3.58 cm                      LVIDs: 2.06 cm  IVSd: 1.15 cm  LVPWd: 1.16 cm                      AO Root Dimension: 3 cm  % Ejection Fraction: 74.5 %                                      LV Systolic dimension: 5.93MV  LV dimension: 3.58 cm               LV PW diastolic: 1.34IC                                      LVOT diameter: 2 cm                                      RA Systolic pressure: 3mmHg  Ascending Aorta: 2.7 cm  Doppler Measurements and Calculations                                          MV Peak E-Wave: 61.7 cm/s                                         MV Peak A-Wave: 72.4 cm/s                                         MV E/A Ratio: 0.85 %  Estimated RAP:3 mmHg                   MV Mean velocity: NaNcm/s                                         MV Peak Gradient: 1.52 mmHg                                         MV P1/2t: 72 msec  MV E' septal velocity: 3.59cm/s        MVA by PHT3.06 cm^2  MV E' lateral velocity:10 cm/s         Assessment     History of recent ruptured appendicitis; status post laparoscopic appendectomy. Continue orogastric tube and decompression.   TPN for nutritional therapy.     YOAN (acute kidney injury). Nephrology following. SLED initiated.     Acute hypoxemic respiratory failure. Continue ventilatory support. Wean when feasible.     History of atrial flutter now with sinus bradycardia. Continue supportive care. EF noted to be 50% at the outside facility. Prognosis is poor.      Please document 90 minutes of critical care time for patient assessment, chart review, discussion with staff, , and family conference.       Jazmin Moore, DO

## 2022-06-30 NOTE — CARE COORDINATION
Patient bathed. Sedation off. Beginning to open eyes. Grimaces to pain all 4 extremities. Faintly nodded no when asked if having pain.

## 2022-06-30 NOTE — PROGRESS NOTES
Palliative Care Progress Note  6/30/2022 10:25 AM    Patient:  Teja Mulligan  YOB: 1940  Primary Care Physician: Armin Rojas  Advance Directive: DNR  Admit Date: 6/29/2022       Hospital Day: 1  Portions of this note have been copied forward, however, changed to reflect the most current clinical status of this patient. CHIEF COMPLAINT/REASON FOR CONSULTATION Goals of care, code status, family support    SUBJECTIVE:  Mr. Oneida Batista remains intubated, sedated. Review of Systems:   14 point review of systems is negative except as specifically addressed above. Objective:   VITALS:  BP (!) 139/40   Pulse 61   Temp (!) 96.6 °F (35.9 °C)   Resp 20   Ht 6' 2\" (1.88 m)   Wt 186 lb 11.2 oz (84.7 kg)   SpO2 96%   BMI 23.97 kg/m²   24HR INTAKE/OUTPUT:      Intake/Output Summary (Last 24 hours) at 6/30/2022 1025  Last data filed at 6/30/2022 1000  Gross per 24 hour   Intake 714.15 ml   Output 260 ml   Net 454.15 ml     General appearance: [de-identified] yo male, acutely ill appearing, no acute distress   Head: Normocephalic, without obvious abnormality, atraumatic  Eyes: conjunctivae/corneas clear. PERRL  Ears: normal external ears and nose  Neck: no JVD, supple, symmetrical, trachea midline   Lungs: diminished at bases otherwise clear to auscultation bilaterally,no rales or wheezes   Heart: regular rate and rhythm, S1, S2 normal, no murmur  Abdomen:Soft, there is grimacing w/ light palpation.  Incision lines from lap appy remain well approximated, no bowel sounds  Extremities:1+ peripheral,  No erythema, no tenderness to palpation  Skin: pale, warm, dry  Neurologic: intubated, follows simple commands w lightened sedation     Medications:      phenylephrine (CORY-SYNEPHRINE) 50mg/250mL infusion      PN-Adult Premix 5/15 - Standard Electrolytes - Central Line      norepinephrine 20 mcg/min (06/30/22 0920)    fentaNYL 25 mcg/hr (06/30/22 0300)    sodium chloride      propofol Stopped (06/30/22 0200)    DOPamine 7.5 mcg/kg/min (06/30/22 0229)      sodium chloride flush  10 mL IntraVENous 2 times per day    pantoprazole (PROTONIX) 40 mg injection  40 mg IntraVENous Daily    chlorhexidine  15 mL Mouth/Throat BID    famotidine (PEPCID) injection  20 mg IntraVENous BID    epoetin shira-epbx  10,000 Units SubCUTAneous Once per day on Mon Wed Fri     albumin human, diphenhydrAMINE, acetaminophen, sodium chloride flush, sodium chloride, ondansetron **OR** ondansetron, acetaminophen **OR** acetaminophen, perflutren lipid microspheres  Diet NPO  PN-Adult Premix 5/15 - Standardard Electrolytes - Central Line     Lab and other Data:     Recent Labs     06/29/22  1429 06/30/22  0430   WBC 20.6* 24.6*   HGB 7.8* 9.1*    216     Recent Labs     06/29/22  1429 06/29/22  1441 06/30/22  0430     --  136   K 3.9 3.5 4.0     --  102   CO2 25  --  21*   BUN 45*  --  55*   CREATININE 3.4*  --  4.1*   GLUCOSE 113*  --  94     Recent Labs     06/29/22  1429 06/30/22  0430   * 214*   ALT 35 40   BILITOT 1.0 1.1   ALKPHOS 122 174*       RAD:   CT ABDOMEN PELVIS WO CONTRAST Additional Contrast? None    Result Date: 6/30/2022  NO PRIOR REPORT AVAILABLE Exam: CT OF THE ABDOMEN/PELVIS WITHOUTIV CONTRAST Clinical data: Recent appendectomy and new onset ascites. Technique: Axial imaging through the abdomen and pelvis without intravenous contrast. Oral contrast was administered. Reformatted/MPR images were performed. Radiation dose: CTDIvol =32.02 mGy, DLP =1678 mGy x cm. Limitations: Lack of intravenous contrast limits evaluation of the solid organs. Prior studies: No prior studies submitted. Findings: Lung bases: Bilateral moderate effusion with basal atelectatic changes. Liver:Unremarkable size andcontour. Normal density. No evidence of mass. No evidence of dilated ducts. Gallbladder Fossa: Diffuse hyperdensity in gallbladder likely sludge. Becca Horton Spleen: Grossly unremarkable. Pancreas:  Grossly unremarkable.  Adrenal glands: Grossly unremarkable size, contour and density. Kidneys: In anatomic position. Grossly unremarkablerenal size, contour and density. No renal or ureteral calculi. No evidence of a renal mass or cyst. Perinephric space is unremarkable. Retroperitoneum: No enlarged retroperitoneal lymphadenopathy. Diffuse atheromatous calcifications in aorta and iliac arteries. IVC is unremarkable. Peritoneal cavity: Bilateral femoral line catheters. Diffuse mesenteric inflammation and abdominal subcutaneous edema. Moderate ascites. Fluid collection right lower quadrant 11.2 x 6.7 cm Gastrointestinal tract: Focal wall irregularity and air pockets in right lower quadrant. Cecal diverticulosis. Appendix: Unremarkable Pelvis: Bladder is minimally distended with hyperdense foci and foleys insitu. Air in bladder lumen. Prostate calculi. Osseous structures: No acute or destructive bony process identified. Degenerative changes in lumbar vertebrae. 1. Right lower quadrant fluid collection 11.2 x 6.7 cm. Bilateral femoral line catheters. Diffuse mesenteric inflammation and abdominal subcutaneous edema 2. Moderate ascites. Focal wall irregularity and air pockets in caecum suspicious for a diverticula or walled-off perforation. No pneumoperitoneum. 3. Diffuse hyperdensity in gallbladder likely sludge. 4. Bladder is minimally distended with hyperdense foci likely clots, foci of emphysema and and foleys insitu. 5. Bilateral moderate effusion with basal atelectatic changes. Recommendation: Follow up as clinically indicated. All CT scans at this facility utilize dose modulation, iterative reconstruction, and/or weight based dosing when appropriate to reduce radiation dose to as low as reasonably achievable. Electronically Signed by Stephan Reese MD at 30-Jun-2022 04:20:10 AM             CT HEAD WO CONTRAST    Result Date: 6/30/2022  NO PRIOR REPORT AVAILABLE Exam: CT OF THE BRAIN WITHOUT CONTRAST Clinical data: Uneven pupils.  Technique: is within normal limits. No acute osseous abnormality is detected. An ETT is in place with the tip 3.7 cm above the flo. An orogastric tube is in place with the tip in the stomach. A right internal jugular central line catheter is in place with the tip within the SVC. There is pulmonary vascular congestion. Left lower lobe atelectasis and or pneumonia with small left pleural effusion. Recommendation: Follow up as clinically indicated. Electronically Signed by Radha Guajardo MD at 29-Jun-2022 03:53:30 PM             ECHO 2D WO COLOR DOPPLER COMPLETE    Result Date: 6/30/2022  Transthoracic Echocardiography Report (TTE)  Demographics   Patient Name   Raghu Rich    Date of Study            06/29/2022   MRN            [id number]        Gender                   Male   Date of Birth  1940    Room Number              OUW-8085   Age            [de-identified] year(s)   Height:        74 inches     Referring Physician      Jayden Nielson MD   Weight:        196 pounds    Sonographer              Hanna Pink [facility]   BSA:           2.15 m^2      Interpreting Physician   Costa Mejias MD   BMI:           25.17 kg/m^2  Procedure Type of Study   TTE procedure:ECHO 2D W/DOPPLER/COLOR/CONTRAST. Study Location: Echo Lab Technical Quality: Poor visualization due to patient on ventilator. Patient Status: Inpatient Contrast Medium: Definity. Amount - 2 ml BP: 144/81 mmHg Indications:Dyspnea/SOB and Atrial fibrillation.   Conclusions   Summary  Technically difficult study with few images of adequate quality for  interpretation [nonexistent Doppler interrogation]  Left ventricle is normal size with well-preserved systolic function EF in  excess of 60%  Mild concentric left ventricular hypertrophy with mild [grade 1] diastolic  dysfunction  Left atrium appears normal size  Poor visualization of the aortic valve which clearly appears to be  thickened but not further characterized because of absence of Doppler  interrogation  Mitral valve appears normally mobile  Right-sided chambers appear to be normal size with preserved RV systolic  function  IVC dimension and inspiratory motion are normal consistent with normal  right atrial filling pressures  Aortic root and ascending segment measure within normal limits  No significant pericardial effusion  Definity contrast utilized in an attempt to better define endocardial  borders but despite its use images extremely poor   Signature   ----------------------------------------------------------------  Electronically signed by Daniela Milan MD(Interpreting physician)  on 06/30/2022 09:05 AM  ----------------------------------------------------------------  2D Measurements and Calculations(cm)   LVIDd: 3.58 cm                      LVIDs: 2.06 cm  IVSd: 1.15 cm  LVPWd: 1.16 cm                      AO Root Dimension: 3 cm  % Ejection Fraction: 74.5 %                                      LV Systolic dimension: 7.46AP  LV dimension: 3.58 cm               LV PW diastolic: 9.02MA                                      LVOT diameter: 2 cm                                      RA Systolic pressure: 3mmHg  Ascending Aorta: 2.7 cm  Doppler Measurements and Calculations                                          MV Peak E-Wave: 61.7 cm/s                                         MV Peak A-Wave: 72.4 cm/s                                         MV E/A Ratio: 0.85 %  Estimated RAP:3 mmHg                   MV Mean velocity: NaNcm/s                                         MV Peak Gradient: 1.52 mmHg                                         MV P1/2t: 72 msec  MV E' septal velocity: 3.59cm/s        MVA by PHT3.06 cm^2  MV E' lateral velocity:10 cm/s          Assessment/Plan   Principal Problem:    YOAN (acute kidney injury) ([facility] [address]  Active Problems:    Bilateral carotid artery stenosis    Septic shock (HCC)    Acute appendicitis    Acute respiratory failure with hypoxia (HCC)    Coagulopathy ([facility] [address]    Palliative care patient  Resolved Problems:    * given 06/28/2022 and 06/29/2022    6. Atrial flutter now w/ bradycardia-resolving, dopamine recommended by Cardiology, Echo noted w/ EF 60% and grade I diastolic dysfunction    7. Hx of bilateral carotid stenosis-followed as OP by Dr. Omkar Ambrocio at [facility]     Thank you for consulting Palliative Care and allowing us to participate in the care of this patient. Time Spent Counseling > 50%:  YES                                   Total Time Spent with patient/family counseling, workup/treatment review, discussion w/ RN, Hospitalist, placement of orders/preparation of this note: 50 minutes    Electronically signed by Brennen Redd PA-C on 6/30/2022 at 10:25 AM    (Please note that portions of this note were completed with a voice recognition program.  Eleno Mace made to edit the dictations but occasionally words are mis-transcribed.)    ADDENDUM: Met with patient's family alongside Dr. Trilby Frankel. Conversation had regarding worsening clinical concerns to include profound hypotension with SLED. They state that Mr. Talisha Mcgarry was very active prior to this event and would not have wanted his life prolonged if he could not have a meaningful quality of life. They have elected to pursue hospice at this time. Additional 40 minutes spent.

## 2022-07-05 LAB
EKG P AXIS: 86 DEGREES
EKG P-R INTERVAL: 144 MS
EKG Q-T INTERVAL: 464 MS
EKG QRS DURATION: 82 MS
EKG QTC CALCULATION (BAZETT): 463 MS
EKG T AXIS: 66 DEGREES

## 2022-07-05 NOTE — PROGRESS NOTES
Physician Progress Note      PATIENTDonikki Fontanez  CSN #:                  [id number]  :                       1940  ADMIT DATE:       2022 1:30 PM  Sandie Camejo DATE:        2022 4:09 PM  RESPONDING  PROVIDER #:        Adrian Gandhi 1935 [employer] DO          QUERY TEXT:    Pt admitted with recent ruptured appendicitis and YOAN. Noted documentation of   Sepsis/Septic Shock on progress notes dated 22 by Dr. Larry Rachel. If   possible, please document in progress notes and discharge summary:    The medical record reflects the following:  Risk Factors: Ruptured Appendicitis,  Clinical Indicators: Started on Levophed at outside hospital, WBC: 20.6 and up   to 24.6, no lactic, no procal, pt is intubated and on vent  Treatment: Levophed,    Thank you in advance,    Sunita Nunez, RN-BSN, [facility]  Clinical   [facility], [address] [facility] [place] [email or web address]  Options provided:  -- Sepsis/Septic Shock confirmed present on admission  -- Sepsis/Septic SHock ruled out  -- Defer to Dr. Larry Rachel consultant documentation regarding Sepsis/Septic Shock  -- Other - I will add my own diagnosis  -- Disagree - Not applicable / Not valid  -- Disagree - Clinically unable to determine / Unknown  -- Refer to Clinical Documentation Reviewer    PROVIDER RESPONSE TEXT:    The diagnosis of Sepsis/Septic Shock was ruled out.     Query created by: Kristin Latham on 2022 9:45 AM      Electronically signed by:  Adrian Gandhi [facility] DO 2022 4:22 PM

## 2023-10-10 NOTE — TELEPHONE ENCOUNTER
Confirmed appts for 6/2/22 with arrival at 8:30 at the [facility] for CT at 9:00 and f/u at 10:00 with Dr Wright. NPO 6 hrs before test.   Cosentyx Counseling:  I discussed with the patient the risks of Cosentyx including but not limited to worsening of Crohn's disease, immunosuppression, allergic reactions and infections.  The patient understands that monitoring is required including a PPD at baseline and must alert us or the primary physician if symptoms of infection or other concerning signs are noted.

## (undated) DEVICE — SUT ETHLN 3/0 FS1 30IN 669H

## (undated) DEVICE — SUT MNCRYL 4/0 PS2 27IN UD MCP426H

## (undated) DEVICE — PROXIMATE RH ROTATING HEAD SKIN STAPLERS (35 WIDE) CONTAINS 35 STAINLESS STEEL STAPLES: Brand: PROXIMATE

## (undated) DEVICE — SUNDT™ EXTERNAL CAROTID ENDARTERECTOMY SHUNT: Brand: SUNDT™

## (undated) DEVICE — DRSNG SURESITE WNDW 4X4.5

## (undated) DEVICE — SHEET,DRAPE,53X77,STERILE: Brand: MEDLINE

## (undated) DEVICE — SUT PROLN 6/0 4/24IN BV1 MON BL M8805

## (undated) DEVICE — Device

## (undated) DEVICE — WIPE MEROCEL 3.625X3IN

## (undated) DEVICE — 3M™ STERI-STRIP™ REINFORCED ADHESIVE SKIN CLOSURES, R1547, 1/2 IN X 4 IN (12 MM X 100 MM), 6 STRIPS/ENVELOPE: Brand: 3M™ STERI-STRIP™

## (undated) DEVICE — GAUZE,SPONGE,4"X4",16PLY,XRAY,STRL,LF: Brand: MEDLINE

## (undated) DEVICE — GLV SURG SENSICARE W/ALOE PF LF 7.5 STRL

## (undated) DEVICE — NDL HYPO PRECISIONGLIDE REG 22G 1 1/2

## (undated) DEVICE — PAD MAJOR VASCULAR: Brand: MEDLINE INDUSTRIES, INC.

## (undated) DEVICE — 3M™ IOBAN™ 2 ANTIMICROBIAL INCISE DRAPE 6650EZ: Brand: IOBAN™ 2

## (undated) DEVICE — SUT PROLN 5/0 C1 DA 24IN 8725H

## (undated) DEVICE — SUT PROLN 7/0 BV1 24IN 4PK M8702

## (undated) DEVICE — SPNG GZ STRL 2S 4X4 12PLY

## (undated) DEVICE — ANTIBACTERIAL UNDYED BRAIDED (POLYGLACTIN 910), SYNTHETIC ABSORBABLE SUTURE: Brand: COATED VICRYL

## (undated) DEVICE — PK TURNOVER RM ADV

## (undated) DEVICE — SYR TB PRECISIONGLIDE 1CC 26G 3/8IN LF

## (undated) DEVICE — RESERVOIR,SUCTION,100CC,SILICONE: Brand: MEDLINE

## (undated) DEVICE — CANN VESL ACRN TP 4MM